# Patient Record
Sex: FEMALE | Race: WHITE | NOT HISPANIC OR LATINO | Employment: FULL TIME | URBAN - METROPOLITAN AREA
[De-identification: names, ages, dates, MRNs, and addresses within clinical notes are randomized per-mention and may not be internally consistent; named-entity substitution may affect disease eponyms.]

---

## 2021-08-05 ENCOUNTER — IMMUNIZATION (OUTPATIENT)
Dept: PRIMARY CARE CLINIC | Facility: CLINIC | Age: 21
End: 2021-08-05
Payer: COMMERCIAL

## 2021-08-05 DIAGNOSIS — Z23 NEED FOR VACCINATION: Primary | ICD-10-CM

## 2021-08-05 PROCEDURE — 0001A COVID-19, MRNA, LNP-S, PF, 30 MCG/0.3 ML DOSE VACCINE: CPT | Mod: CV19,S$GLB,, | Performed by: FAMILY MEDICINE

## 2021-08-05 PROCEDURE — 91300 COVID-19, MRNA, LNP-S, PF, 30 MCG/0.3 ML DOSE VACCINE: CPT | Mod: S$GLB,,, | Performed by: FAMILY MEDICINE

## 2021-08-05 PROCEDURE — 91300 COVID-19, MRNA, LNP-S, PF, 30 MCG/0.3 ML DOSE VACCINE: ICD-10-PCS | Mod: S$GLB,,, | Performed by: FAMILY MEDICINE

## 2021-08-05 PROCEDURE — 0001A COVID-19, MRNA, LNP-S, PF, 30 MCG/0.3 ML DOSE VACCINE: ICD-10-PCS | Mod: CV19,S$GLB,, | Performed by: FAMILY MEDICINE

## 2021-08-26 ENCOUNTER — IMMUNIZATION (OUTPATIENT)
Dept: PRIMARY CARE CLINIC | Facility: CLINIC | Age: 21
End: 2021-08-26
Payer: COMMERCIAL

## 2021-08-26 DIAGNOSIS — Z23 NEED FOR VACCINATION: Primary | ICD-10-CM

## 2021-08-26 PROCEDURE — 0002A COVID-19, MRNA, LNP-S, PF, 30 MCG/0.3 ML DOSE VACCINE: CPT | Mod: CV19,S$GLB,, | Performed by: FAMILY MEDICINE

## 2021-08-26 PROCEDURE — 91300 COVID-19, MRNA, LNP-S, PF, 30 MCG/0.3 ML DOSE VACCINE: ICD-10-PCS | Mod: S$GLB,,, | Performed by: FAMILY MEDICINE

## 2021-08-26 PROCEDURE — 91300 COVID-19, MRNA, LNP-S, PF, 30 MCG/0.3 ML DOSE VACCINE: CPT | Mod: S$GLB,,, | Performed by: FAMILY MEDICINE

## 2021-08-26 PROCEDURE — 0002A COVID-19, MRNA, LNP-S, PF, 30 MCG/0.3 ML DOSE VACCINE: ICD-10-PCS | Mod: CV19,S$GLB,, | Performed by: FAMILY MEDICINE

## 2021-11-30 ENCOUNTER — OFFICE VISIT (OUTPATIENT)
Dept: FAMILY MEDICINE | Facility: CLINIC | Age: 21
End: 2021-11-30
Payer: COMMERCIAL

## 2021-11-30 VITALS
WEIGHT: 135.56 LBS | HEART RATE: 81 BPM | TEMPERATURE: 98 F | HEIGHT: 63 IN | BODY MASS INDEX: 24.02 KG/M2 | DIASTOLIC BLOOD PRESSURE: 70 MMHG | OXYGEN SATURATION: 98 % | SYSTOLIC BLOOD PRESSURE: 116 MMHG

## 2021-11-30 DIAGNOSIS — Z00.00 ANNUAL PHYSICAL EXAM: Primary | ICD-10-CM

## 2021-11-30 PROCEDURE — 99999 PR PBB SHADOW E&M-EST. PATIENT-LVL III: CPT | Mod: PBBFAC,,, | Performed by: PHYSICIAN ASSISTANT

## 2021-11-30 PROCEDURE — 99385 PREV VISIT NEW AGE 18-39: CPT | Mod: S$GLB,,, | Performed by: PHYSICIAN ASSISTANT

## 2021-11-30 PROCEDURE — 99999 PR PBB SHADOW E&M-EST. PATIENT-LVL III: ICD-10-PCS | Mod: PBBFAC,,, | Performed by: PHYSICIAN ASSISTANT

## 2021-11-30 PROCEDURE — 99385 PR PREVENTIVE VISIT,NEW,18-39: ICD-10-PCS | Mod: S$GLB,,, | Performed by: PHYSICIAN ASSISTANT

## 2021-11-30 RX ORDER — NORGESTIMATE AND ETHINYL ESTRADIOL 0.25-0.035
1 KIT ORAL DAILY
COMMUNITY
Start: 2021-11-01 | End: 2021-11-30 | Stop reason: SDUPTHER

## 2021-11-30 RX ORDER — NORGESTIMATE AND ETHINYL ESTRADIOL 0.25-0.035
1 KIT ORAL DAILY
Qty: 30 TABLET | Refills: 11 | Status: SHIPPED | OUTPATIENT
Start: 2021-11-30 | End: 2022-10-20 | Stop reason: SDUPTHER

## 2021-12-15 ENCOUNTER — LAB VISIT (OUTPATIENT)
Dept: LAB | Facility: HOSPITAL | Age: 21
End: 2021-12-15
Attending: PHYSICIAN ASSISTANT
Payer: COMMERCIAL

## 2021-12-15 DIAGNOSIS — Z00.00 ANNUAL PHYSICAL EXAM: ICD-10-CM

## 2021-12-15 LAB
ALBUMIN SERPL BCP-MCNC: 3.9 G/DL (ref 3.5–5.2)
ALP SERPL-CCNC: 66 U/L (ref 55–135)
ALT SERPL W/O P-5'-P-CCNC: 6 U/L (ref 10–44)
ANION GAP SERPL CALC-SCNC: 9 MMOL/L (ref 8–16)
AST SERPL-CCNC: 14 U/L (ref 10–40)
BASOPHILS # BLD AUTO: 0.07 K/UL (ref 0–0.2)
BASOPHILS NFR BLD: 0.9 % (ref 0–1.9)
BILIRUB SERPL-MCNC: 0.8 MG/DL (ref 0.1–1)
BUN SERPL-MCNC: 8 MG/DL (ref 6–20)
CALCIUM SERPL-MCNC: 9.3 MG/DL (ref 8.7–10.5)
CHLORIDE SERPL-SCNC: 104 MMOL/L (ref 95–110)
CHOLEST SERPL-MCNC: 135 MG/DL (ref 120–199)
CHOLEST/HDLC SERPL: 2.6 {RATIO} (ref 2–5)
CO2 SERPL-SCNC: 21 MMOL/L (ref 23–29)
CREAT SERPL-MCNC: 0.7 MG/DL (ref 0.5–1.4)
DIFFERENTIAL METHOD: ABNORMAL
EOSINOPHIL # BLD AUTO: 1.2 K/UL (ref 0–0.5)
EOSINOPHIL NFR BLD: 14.5 % (ref 0–8)
ERYTHROCYTE [DISTWIDTH] IN BLOOD BY AUTOMATED COUNT: 12 % (ref 11.5–14.5)
EST. GFR  (AFRICAN AMERICAN): >60 ML/MIN/1.73 M^2
EST. GFR  (NON AFRICAN AMERICAN): >60 ML/MIN/1.73 M^2
GLUCOSE SERPL-MCNC: 87 MG/DL (ref 70–110)
HCT VFR BLD AUTO: 36.3 % (ref 37–48.5)
HCV AB SERPL QL IA: NEGATIVE
HDLC SERPL-MCNC: 52 MG/DL (ref 40–75)
HDLC SERPL: 38.5 % (ref 20–50)
HGB BLD-MCNC: 12.1 G/DL (ref 12–16)
HIV 1+2 AB+HIV1 P24 AG SERPL QL IA: NEGATIVE
IMM GRANULOCYTES # BLD AUTO: 0.03 K/UL (ref 0–0.04)
IMM GRANULOCYTES NFR BLD AUTO: 0.4 % (ref 0–0.5)
LDLC SERPL CALC-MCNC: 72 MG/DL (ref 63–159)
LYMPHOCYTES # BLD AUTO: 2.2 K/UL (ref 1–4.8)
LYMPHOCYTES NFR BLD: 26.9 % (ref 18–48)
MCH RBC QN AUTO: 29.5 PG (ref 27–31)
MCHC RBC AUTO-ENTMCNC: 33.3 G/DL (ref 32–36)
MCV RBC AUTO: 89 FL (ref 82–98)
MONOCYTES # BLD AUTO: 0.6 K/UL (ref 0.3–1)
MONOCYTES NFR BLD: 8 % (ref 4–15)
NEUTROPHILS # BLD AUTO: 4 K/UL (ref 1.8–7.7)
NEUTROPHILS NFR BLD: 49.3 % (ref 38–73)
NONHDLC SERPL-MCNC: 83 MG/DL
NRBC BLD-RTO: 0 /100 WBC
PLATELET # BLD AUTO: 207 K/UL (ref 150–450)
PMV BLD AUTO: 12.2 FL (ref 9.2–12.9)
POTASSIUM SERPL-SCNC: 3.6 MMOL/L (ref 3.5–5.1)
PROT SERPL-MCNC: 7 G/DL (ref 6–8.4)
RBC # BLD AUTO: 4.1 M/UL (ref 4–5.4)
SODIUM SERPL-SCNC: 134 MMOL/L (ref 136–145)
TRIGL SERPL-MCNC: 55 MG/DL (ref 30–150)
WBC # BLD AUTO: 8 K/UL (ref 3.9–12.7)

## 2021-12-15 PROCEDURE — 36415 COLL VENOUS BLD VENIPUNCTURE: CPT | Mod: PO | Performed by: PHYSICIAN ASSISTANT

## 2021-12-15 PROCEDURE — 80061 LIPID PANEL: CPT | Performed by: PHYSICIAN ASSISTANT

## 2021-12-15 PROCEDURE — 86803 HEPATITIS C AB TEST: CPT | Performed by: PHYSICIAN ASSISTANT

## 2021-12-15 PROCEDURE — 87389 HIV-1 AG W/HIV-1&-2 AB AG IA: CPT | Performed by: PHYSICIAN ASSISTANT

## 2021-12-15 PROCEDURE — 85025 COMPLETE CBC W/AUTO DIFF WBC: CPT | Performed by: PHYSICIAN ASSISTANT

## 2021-12-15 PROCEDURE — 80053 COMPREHEN METABOLIC PANEL: CPT | Performed by: PHYSICIAN ASSISTANT

## 2021-12-16 DIAGNOSIS — R82.90 ABNORMAL URINE: Primary | ICD-10-CM

## 2021-12-17 ENCOUNTER — LAB VISIT (OUTPATIENT)
Dept: LAB | Facility: HOSPITAL | Age: 21
End: 2021-12-17
Attending: PHYSICIAN ASSISTANT
Payer: COMMERCIAL

## 2021-12-17 DIAGNOSIS — R82.90 ABNORMAL URINE: ICD-10-CM

## 2021-12-17 LAB
BACTERIA #/AREA URNS AUTO: ABNORMAL /HPF
BILIRUB UR QL STRIP: NEGATIVE
CAOX CRY UR QL COMP ASSIST: ABNORMAL
CLARITY UR REFRACT.AUTO: ABNORMAL
COLOR UR AUTO: ABNORMAL
GLUCOSE UR QL STRIP: NEGATIVE
HGB UR QL STRIP: ABNORMAL
HYALINE CASTS UR QL AUTO: 0 /LPF
KETONES UR QL STRIP: NEGATIVE
LEUKOCYTE ESTERASE UR QL STRIP: ABNORMAL
MICROSCOPIC COMMENT: ABNORMAL
NITRITE UR QL STRIP: NEGATIVE
PH UR STRIP: 5 [PH] (ref 5–8)
PROT UR QL STRIP: ABNORMAL
RBC #/AREA URNS AUTO: 12 /HPF (ref 0–4)
SP GR UR STRIP: 1.02 (ref 1–1.03)
SQUAMOUS #/AREA URNS AUTO: 21 /HPF
URN SPEC COLLECT METH UR: ABNORMAL
WBC #/AREA URNS AUTO: 71 /HPF (ref 0–5)

## 2021-12-17 PROCEDURE — 81001 URINALYSIS AUTO W/SCOPE: CPT | Performed by: PHYSICIAN ASSISTANT

## 2021-12-21 DIAGNOSIS — R82.90 ABNORMAL URINE: Primary | ICD-10-CM

## 2021-12-29 ENCOUNTER — APPOINTMENT (OUTPATIENT)
Dept: FAMILY MEDICINE | Facility: CLINIC | Age: 21
End: 2021-12-29
Payer: COMMERCIAL

## 2022-10-20 RX ORDER — NORGESTIMATE AND ETHINYL ESTRADIOL 0.25-0.035
1 KIT ORAL DAILY
Qty: 30 TABLET | Refills: 0 | Status: SHIPPED | OUTPATIENT
Start: 2022-10-20 | End: 2022-11-17

## 2022-10-20 NOTE — TELEPHONE ENCOUNTER
----- Message from Renetta Jurado sent at 10/20/2022  8:03 AM CDT -----  Type:  RX Refill Request    Who Called: Pt Mother Aviva     Refill or New Rx: Refill     RX Name and Strength:NORMA 0.25-35 mg-mcg per tablet    How is the patient currently taking it? Sig - Route: Take 1 tablet by mouth once daily. - Oral  Sent to pharmacy as: NORMA 0.25-35 mg-mcg per tablet        Is this a 30 day or 90 day RX:    Preferred Pharmacy with phone number:[facility] #[id number] - [place], [place] - [address] [facility]    Local or Mail Order:Local     Ordering Provider:TYRA Angulo    Would the patient rather a call back or a response via [facility]?  Call back     Best Call Back Number: ([email or web address]). [phone] Mother     Additional Information:

## 2022-11-06 ENCOUNTER — HOSPITAL ENCOUNTER (EMERGENCY)
Facility: HOSPITAL | Age: 22
Discharge: HOME | End: 2022-11-06
Attending: EMERGENCY MEDICINE
Payer: COMMERCIAL

## 2022-11-06 VITALS
RESPIRATION RATE: 16 BRPM | BODY MASS INDEX: 22.15 KG/M2 | HEIGHT: 63 IN | DIASTOLIC BLOOD PRESSURE: 83 MMHG | SYSTOLIC BLOOD PRESSURE: 147 MMHG | OXYGEN SATURATION: 99 % | WEIGHT: 125 LBS | HEART RATE: 101 BPM | TEMPERATURE: 98 F

## 2022-11-06 DIAGNOSIS — S39.012A STRAIN OF LUMBAR REGION, INITIAL ENCOUNTER: Primary | ICD-10-CM

## 2022-11-06 LAB
B-HCG UR QL: NEGATIVE
CTP QC/QA: YES

## 2022-11-06 PROCEDURE — 99283 EMERGENCY DEPT VISIT LOW MDM: CPT

## 2022-11-06 PROCEDURE — 81025 URINE PREGNANCY TEST: CPT | Performed by: NURSE PRACTITIONER

## 2022-11-06 RX ORDER — NAPROXEN 500 MG/1
500 TABLET ORAL 2 TIMES DAILY PRN
Qty: 20 TABLET | Refills: 0 | Status: SHIPPED | OUTPATIENT
Start: 2022-11-06 | End: 2023-02-15

## 2022-11-06 RX ORDER — METHOCARBAMOL 500 MG/1
1000 TABLET, FILM COATED ORAL
Qty: 30 TABLET | Refills: 0 | Status: SHIPPED | OUTPATIENT
Start: 2022-11-06 | End: 2022-11-11

## 2022-11-06 NOTE — ED PROVIDER NOTES
Encounter Date: 11/6/2022       History     Chief Complaint   Patient presents with    Back Pain     At 1030 today. Mid back while lifting heavy object     21-year-old female presents complaining of back pain starting today patient reports that she was picking up a heavy object and experience some pain in the low to mid back area patient denies fall or trauma patient denies weakness or numbness patient denies incontinence patient has no other acute complaints at this time.    Review of patient's allergies indicates:  No Known Allergies  No past medical history on file.  No past surgical history on file.  No family history on file.  Social History     Tobacco Use    Smoking status: Never    Smokeless tobacco: Never   Substance Use Topics    Alcohol use: Yes     Comment: socially     Drug use: Never     Review of Systems   Constitutional:  Negative for fever.   HENT:  Negative for congestion, rhinorrhea, sore throat and trouble swallowing.    Eyes:  Negative for visual disturbance.   Respiratory:  Negative for cough, chest tightness, shortness of breath and wheezing.    Cardiovascular:  Negative for chest pain, palpitations and leg swelling.   Gastrointestinal:  Negative for abdominal distention, abdominal pain, constipation, diarrhea, nausea and vomiting.   Genitourinary:  Negative for difficulty urinating, dysuria, flank pain and frequency.   Musculoskeletal:  Positive for back pain. Negative for arthralgias, joint swelling and neck pain.   Skin:  Negative for color change and rash.   Neurological:  Negative for dizziness, syncope, speech difficulty, weakness, numbness and headaches.   All other systems reviewed and are negative.    Physical Exam     Initial Vitals [11/06/22 1220]   BP Pulse Resp Temp SpO2   (!) 147/83 101 16 98.1 °F (36.7 °C) 99 %      MAP       --         Physical Exam    Nursing note and vitals reviewed.  Constitutional: She appears well-developed and well-nourished. She is not diaphoretic. No  distress.   HENT:   Head: Normocephalic and atraumatic.   Right Ear: External ear normal.   Left Ear: External ear normal.   Nose: Nose normal.   Mouth/Throat: Oropharynx is clear and moist. No oropharyngeal exudate.   Eyes: Conjunctivae and EOM are normal. Pupils are equal, round, and reactive to light. Right eye exhibits no discharge. Left eye exhibits no discharge. No scleral icterus.   Neck: Neck supple. No thyromegaly present. No tracheal deviation present. No JVD present.   Normal range of motion.  Cardiovascular:  Normal rate, regular rhythm, normal heart sounds and intact distal pulses.     Exam reveals no gallop and no friction rub.       No murmur heard.  Pulmonary/Chest: Breath sounds normal. No stridor. No respiratory distress. She has no wheezes. She has no rhonchi. She has no rales. She exhibits no tenderness.   Abdominal: Abdomen is soft. Bowel sounds are normal. She exhibits no distension and no mass. There is no abdominal tenderness. There is no rebound and no guarding.   Musculoskeletal:         General: Tenderness present. No edema. Normal range of motion.      Cervical back: Normal range of motion and neck supple.      Comments: Patient has right paraspinal tenderness with no radiation no step-off or bony deformity of the spine no midline tenderness     Lymphadenopathy:     She has no cervical adenopathy.   Neurological: She is alert and oriented to person, place, and time. She has normal strength. No cranial nerve deficit or sensory deficit.   Skin: Skin is warm and dry. No rash and no abscess noted. No erythema. No pallor.       ED Course   Procedures  Labs Reviewed   POCT URINE PREGNANCY          Imaging Results              X-Ray Lumbar Spine 5 View (Final result)  Result time 11/06/22 14:32:33   Procedure changed from X-Ray Lumbar Spine Ap And Lateral     Final result by Jordan Camacho MD (11/06/22 14:32:33)                   Narrative:    HISTORY: Low back pain,  lifting  injury.    FINDINGS: 5 views of the lumbar spine with no prior studies for comparison show normal lordotic curvature and vertebral body alignment, with no acute fractures or destructive osseous lesions. The intervertebral disc spaces are preserved, with no facet arthropathy or spondylolysis.    The sacroiliac joints are normal, with normal bone mineralization. No radiopaque foreign bodies.    IMPRESSION: Negative lumbar spine radiographs.    Electronically signed by:  Jordan Camacho MD  11/6/2022 2:32 PM CST Workstation: [id number]                                     Medications - No data to display  Medical Decision Making:   History:   Old Medical Records: I decided to obtain old medical records.  Initial Assessment:   Emergent evaluation of a 21-year-old female presenting with back pain differential diagnosis includes soft tissue injury, fracture, sprain          Attending Attestation:             Attending ED Notes:   Patient's imaging shows no significant acute abnormalities patient is diagnosed with musculoskeletal pain and will be started on a course of muscle relaxer and pain medication patient is to follow up with PCP in the next 2-3 days for further evaluation and management and is cautioned to return immediately to the ER for any worsening or for any further concerns    I had a detailed discussion with the patient and/or guardian regarding: The historical points, exam findings, and diagnostic results supporting the discharge diagnosis, lab results, pertinent radiology results, and the need for outpatient follow-up, for definitive care with a family practitioner and to return to the emergency department if symptoms worsen or persist or if there are any questions or concerns that arise at home. All questions have been answered in detail. Strict return to Emergency Department precautions have been provided.     A dictation software program was used for this note.  Please expect some simple typographical   errors in this note.     This patient was seen during the context of the Covid 19 global pandemic where local, state, hospital guidelines, were followed to the best of ability given the circumstances of the pandemic.                       Clinical Impression:   Final diagnoses:  [S39.012A] Strain of lumbar region, initial encounter (Primary)      ED Disposition Condition    Discharge Stable          ED Prescriptions       Medication Sig Dispense Start Date End Date Auth. Provider    naproxen (NAPROSYN) 500 MG tablet Take 1 tablet (500 mg total) by mouth 2 (two) times daily as needed (As needed for pain, take with meals). 20 tablet 11/6/2022 -- Kieran Lockwood MD    methocarbamoL (ROBAXIN) 500 MG Tab Take 2 tablets (1,000 mg total) by mouth after meals as needed (As needed for muscle soreness). 30 tablet 11/6/2022 11/11/2022 Kieran Lockwood MD          Follow-up Information       Follow up With Specialties Details Why Contact Info Additional Information    [facility] - Emergency Dept Emergency Medicine  If symptoms worsen [address] [facility] [id number] [phone] 1st floor    Cordelia Santos MD Family Medicine Schedule an appointment as soon as possible for a visit in 2 days  [address]  [facility] [id number] [phone]                Kieran Lockwood MD  11/06/22 [id number]

## 2022-12-05 ENCOUNTER — OCCUPATIONAL HEALTH (OUTPATIENT)
Dept: URGENT CARE | Facility: CLINIC | Age: 22
End: 2022-12-05

## 2022-12-05 DIAGNOSIS — Z13.9 ENCOUNTER FOR SCREENING: Primary | ICD-10-CM

## 2022-12-05 LAB
COLLECTION ONLY: NORMAL
TB INDURATION - 48 HR READ: NORMAL
TB INDURATION - 72 HR READ: NORMAL
TB SKIN TEST - 48 HR READ: NORMAL
TB SKIN TEST - 72 HR READ: NORMAL

## 2022-12-05 PROCEDURE — 86580 POCT TB SKIN TEST: ICD-10-PCS | Mod: S$GLB,,, | Performed by: NURSE PRACTITIONER

## 2022-12-05 PROCEDURE — 86580 TB INTRADERMAL TEST: CPT | Mod: S$GLB,,, | Performed by: NURSE PRACTITIONER

## 2023-02-15 ENCOUNTER — OFFICE VISIT (OUTPATIENT)
Dept: FAMILY MEDICINE | Facility: CLINIC | Age: 23
End: 2023-02-15
Payer: COMMERCIAL

## 2023-02-15 VITALS
HEART RATE: 82 BPM | TEMPERATURE: 98 F | SYSTOLIC BLOOD PRESSURE: 112 MMHG | DIASTOLIC BLOOD PRESSURE: 68 MMHG | HEIGHT: 63 IN | OXYGEN SATURATION: 100 % | WEIGHT: 137.38 LBS | BODY MASS INDEX: 24.34 KG/M2

## 2023-02-15 DIAGNOSIS — Z30.9 ENCOUNTER FOR CONTRACEPTIVE MANAGEMENT, UNSPECIFIED TYPE: ICD-10-CM

## 2023-02-15 DIAGNOSIS — K12.0 APHTHOUS ULCER: ICD-10-CM

## 2023-02-15 DIAGNOSIS — Z00.00 ANNUAL PHYSICAL EXAM: Primary | ICD-10-CM

## 2023-02-15 PROCEDURE — 99395 PREV VISIT EST AGE 18-39: CPT | Mod: S$GLB,,, | Performed by: PHYSICIAN ASSISTANT

## 2023-02-15 PROCEDURE — 99999 PR PBB SHADOW E&M-EST. PATIENT-LVL III: CPT | Mod: PBBFAC,,, | Performed by: PHYSICIAN ASSISTANT

## 2023-02-15 PROCEDURE — 99999 PR PBB SHADOW E&M-EST. PATIENT-LVL III: ICD-10-PCS | Mod: PBBFAC,,, | Performed by: PHYSICIAN ASSISTANT

## 2023-02-15 PROCEDURE — 99395 PR PREVENTIVE VISIT,EST,18-39: ICD-10-PCS | Mod: S$GLB,,, | Performed by: PHYSICIAN ASSISTANT

## 2023-02-15 RX ORDER — TRIAMCINOLONE ACETONIDE 1 MG/G
PASTE DENTAL 2 TIMES DAILY
Qty: 5 G | Refills: 12 | Status: SHIPPED | OUTPATIENT
Start: 2023-02-15 | End: 2023-03-17

## 2023-02-15 RX ORDER — NORGESTIMATE AND ETHINYL ESTRADIOL 0.25-0.035
1 KIT ORAL DAILY
Qty: 28 TABLET | Refills: 2 | Status: SHIPPED | OUTPATIENT
Start: 2023-02-15 | End: 2023-05-08 | Stop reason: SDUPTHER

## 2023-02-15 NOTE — PROGRESS NOTES
Subjective:       Patient ID: Iris Coronado is a 22 y.o. female.    Chief Complaint: Annual Exam    Patient presents for annual exam.  She is due for PAP and Chlamydia screenings.  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  Patients patient medical/surgical, social and family histories have been reviewed       Review of Systems   Constitutional:  Negative for activity change, appetite change, fatigue and unexpected weight change.   HENT:  Positive for mouth sores.    Eyes:  Negative for visual disturbance.   Respiratory:  Negative for cough and shortness of breath.    Cardiovascular:  Negative for chest pain, palpitations and leg swelling.   Gastrointestinal:  Negative for abdominal pain, constipation, diarrhea and nausea.   Endocrine: Negative for polydipsia and polyuria.   Genitourinary:  Negative for difficulty urinating.   Musculoskeletal:  Negative for arthralgias.   Skin:  Negative for rash.   Neurological:  Negative for dizziness, light-headedness and headaches.   Psychiatric/Behavioral:  Negative for dysphoric mood and sleep disturbance. The patient is not nervous/anxious.      Objective:      Physical Exam  Constitutional:       General: She is not in acute distress.     Appearance: She is well-developed.   HENT:      Head: Normocephalic and atraumatic.      Right Ear: Tympanic membrane, ear canal and external ear normal.      Left Ear: Tympanic membrane, ear canal and external ear normal.      Mouth/Throat:      Mouth: Mucous membranes are moist.      Pharynx: Oropharynx is clear.   Eyes:      Conjunctiva/sclera: Conjunctivae normal.   Cardiovascular:      Rate and Rhythm: Normal rate and regular rhythm.      Heart sounds: Normal heart sounds.   Pulmonary:      Effort: Pulmonary effort is normal.      Breath sounds: Normal breath sounds.   Musculoskeletal:      Right lower leg: No edema.      Left lower leg: No edema.   Skin:     General: Skin is warm and dry.   Neurological:      General: No  "focal deficit present.      Mental Status: She is alert.   Psychiatric:         Behavior: Behavior is cooperative.       Assessment:       1. Annual physical exam    2. Aphthous ulcer    3. Encounter for contraceptive management, unspecified type          Plan:       Iris was seen today for annual exam.    Diagnoses and all orders for this visit:    Annual physical exam    Aphthous ulcer  -     triamcinolone acetonide 0.1% (KENALOG) 0.1 % paste; Place onto teeth 2 (two) times daily.    Encounter for contraceptive management, unspecified type  -     norgestimate-ethinyl estradioL (NORMA) 0.25-35 mg-mcg per tablet; Take 1 tablet by mouth once daily.           Follow up for PAP within next 3 mo .           Documentation entered by me for this encounter may have been done in part using speech-recognition technology. Although I have made an effort to ensure accuracy, "sound like" errors may exist and should be interpreted in context.    "

## 2023-05-08 DIAGNOSIS — Z30.9 ENCOUNTER FOR CONTRACEPTIVE MANAGEMENT, UNSPECIFIED TYPE: ICD-10-CM

## 2023-05-08 NOTE — TELEPHONE ENCOUNTER
----- Message from David Montoya sent at 5/8/2023  4:42 PM CDT -----  Regarding: Return Call  Contact: Patient  Type:  Patient Returning Call    Who Called:Patient   Who Left Message for Patient:office staff please call  Does the patient know what this is regarding?:Birth Control medication running out before appointment 05/17/23  Would the patient rather a call back or a response via [facility]? call  Best Call Back Number:[phone]  Additional Information:   [id number] [address] & [address] [address]  Phone: [phone] Fax: [phone]    Patient called regarding needing one order of birth control before visit.

## 2023-05-10 RX ORDER — NORGESTIMATE AND ETHINYL ESTRADIOL 0.25-0.035
1 KIT ORAL DAILY
Qty: 28 TABLET | Refills: 11 | Status: SHIPPED | OUTPATIENT
Start: 2023-05-10

## 2023-05-24 ENCOUNTER — OFFICE VISIT (OUTPATIENT)
Dept: FAMILY MEDICINE | Facility: CLINIC | Age: 23
End: 2023-05-24
Payer: COMMERCIAL

## 2023-05-24 VITALS
DIASTOLIC BLOOD PRESSURE: 70 MMHG | TEMPERATURE: 98 F | RESPIRATION RATE: 18 BRPM | OXYGEN SATURATION: 99 % | HEART RATE: 114 BPM | WEIGHT: 131.63 LBS | HEIGHT: 63 IN | SYSTOLIC BLOOD PRESSURE: 128 MMHG | BODY MASS INDEX: 23.32 KG/M2

## 2023-05-24 DIAGNOSIS — Z12.4 SCREENING FOR CERVICAL CANCER: ICD-10-CM

## 2023-05-24 DIAGNOSIS — Z11.8 SCREENING FOR CHLAMYDIAL DISEASE: ICD-10-CM

## 2023-05-24 DIAGNOSIS — Z00.00 ANNUAL PHYSICAL EXAM: Primary | ICD-10-CM

## 2023-05-24 PROCEDURE — 99999 PR PBB SHADOW E&M-EST. PATIENT-LVL IV: CPT | Mod: PBBFAC,,, | Performed by: PHYSICIAN ASSISTANT

## 2023-05-24 PROCEDURE — 87591 N.GONORRHOEAE DNA AMP PROB: CPT | Performed by: PHYSICIAN ASSISTANT

## 2023-05-24 PROCEDURE — 99999 PR PBB SHADOW E&M-EST. PATIENT-LVL IV: ICD-10-PCS | Mod: PBBFAC,,, | Performed by: PHYSICIAN ASSISTANT

## 2023-05-24 PROCEDURE — 88175 CYTOPATH C/V AUTO FLUID REDO: CPT | Performed by: PHYSICIAN ASSISTANT

## 2023-05-24 PROCEDURE — 99395 PR PREVENTIVE VISIT,EST,18-39: ICD-10-PCS | Mod: S$GLB,,, | Performed by: PHYSICIAN ASSISTANT

## 2023-05-24 PROCEDURE — 99395 PREV VISIT EST AGE 18-39: CPT | Mod: S$GLB,,, | Performed by: PHYSICIAN ASSISTANT

## 2023-05-24 NOTE — PROGRESS NOTES
Subjective:       Patient ID: Iris Coronado is a 22 y.o. female.    Chief Complaint: Abnormal Pap Smear    Patient presents for annual exam including cervical cancer screening.  She is .  She has never had PAP.  OCP for contraception.  She has normal monthly menses. Patients patient medical/surgical, social and family histories have been reviewed         Review of Systems   Genitourinary:  Negative for menstrual problem and pelvic pain.     Objective:      Physical Exam  Exam conducted with a chaperone present.   Constitutional:       General: She is not in acute distress.     Appearance: Normal appearance. She is not ill-appearing.   HENT:      Head: Normocephalic and atraumatic.   Eyes:      Conjunctiva/sclera: Conjunctivae normal.   Pulmonary:      Effort: Pulmonary effort is normal.   Chest:   Breasts:     Right: Normal.      Left: Normal.   Genitourinary:     Exam position: Supine.      Pubic Area: No rash.       Labia:         Right: No rash or lesion.         Left: No rash or lesion.       Urethra: No prolapse.      Vagina: Normal.      Cervix: Normal.      Uterus: Normal.       Adnexa: Right adnexa normal and left adnexa normal.   Lymphadenopathy:      Upper Body:      Right upper body: No axillary or pectoral adenopathy.      Left upper body: No axillary or pectoral adenopathy.      Lower Body: No right inguinal adenopathy. No left inguinal adenopathy.   Skin:         Neurological:      Mental Status: She is alert.   Psychiatric:         Mood and Affect: Mood normal.       Assessment:       1. Annual physical exam    2. Screening for cervical cancer    3. Screening for chlamydial disease        Plan:       Iris was seen today for abnormal pap smear.    Diagnoses and all orders for this visit:    Annual physical exam  -     Ambulatory referral/consult to Dermatology; Future    Screening for cervical cancer  -     Liquid-Based Pap Smear, Screening    Screening for chlamydial disease  -     C.  trachomatis/N. gonorrhoeae by AMP DNA [facility]; Cervicovaginal         Further recommendations will be made based on results

## 2023-05-25 ENCOUNTER — TELEPHONE (OUTPATIENT)
Dept: FAMILY MEDICINE | Facility: CLINIC | Age: 23
End: 2023-05-25
Payer: COMMERCIAL

## 2023-05-25 DIAGNOSIS — A74.9 CHLAMYDIA INFECTION: Primary | ICD-10-CM

## 2023-05-25 DIAGNOSIS — Z11.3 ROUTINE SCREENING FOR STI (SEXUALLY TRANSMITTED INFECTION): ICD-10-CM

## 2023-05-25 LAB
C TRACH DNA SPEC QL NAA+PROBE: DETECTED
N GONORRHOEA DNA SPEC QL NAA+PROBE: NOT DETECTED

## 2023-05-25 RX ORDER — DOXYCYCLINE HYCLATE 100 MG
100 TABLET ORAL 2 TIMES DAILY
Qty: 14 TABLET | Refills: 0 | Status: SHIPPED | OUTPATIENT
Start: 2023-05-25 | End: 2023-06-01

## 2023-05-26 ENCOUNTER — TELEPHONE (OUTPATIENT)
Dept: FAMILY MEDICINE | Facility: CLINIC | Age: 23
End: 2023-05-26
Payer: COMMERCIAL

## 2023-05-26 ENCOUNTER — TELEPHONE (OUTPATIENT)
Dept: DERMATOLOGY | Facility: CLINIC | Age: 23
End: 2023-05-26
Payer: COMMERCIAL

## 2023-05-30 LAB
FINAL PATHOLOGIC DIAGNOSIS: NORMAL
Lab: NORMAL

## 2023-06-16 ENCOUNTER — LAB VISIT (OUTPATIENT)
Dept: LAB | Facility: HOSPITAL | Age: 23
End: 2023-06-16
Attending: PHYSICIAN ASSISTANT
Payer: COMMERCIAL

## 2023-06-16 DIAGNOSIS — Z11.3 ROUTINE SCREENING FOR STI (SEXUALLY TRANSMITTED INFECTION): ICD-10-CM

## 2023-06-16 PROCEDURE — 87389 HIV-1 AG W/HIV-1&-2 AB AG IA: CPT | Performed by: PHYSICIAN ASSISTANT

## 2023-06-16 PROCEDURE — 86592 SYPHILIS TEST NON-TREP QUAL: CPT | Performed by: PHYSICIAN ASSISTANT

## 2023-06-16 PROCEDURE — 80074 ACUTE HEPATITIS PANEL: CPT | Performed by: PHYSICIAN ASSISTANT

## 2023-06-16 PROCEDURE — 36415 COLL VENOUS BLD VENIPUNCTURE: CPT | Mod: PO | Performed by: PHYSICIAN ASSISTANT

## 2023-06-17 LAB
HAV IGM SERPL QL IA: NORMAL
HBV CORE IGM SERPL QL IA: NORMAL
HBV SURFACE AG SERPL QL IA: NORMAL
HCV AB SERPL QL IA: NORMAL
HIV 1+2 AB+HIV1 P24 AG SERPL QL IA: NORMAL
RPR SER QL: NORMAL

## 2023-06-20 ENCOUNTER — OFFICE VISIT (OUTPATIENT)
Dept: FAMILY MEDICINE | Facility: CLINIC | Age: 23
End: 2023-06-20
Payer: COMMERCIAL

## 2023-06-20 ENCOUNTER — HOSPITAL ENCOUNTER (OUTPATIENT)
Dept: RADIOLOGY | Facility: CLINIC | Age: 23
Discharge: HOME | End: 2023-06-20
Attending: PHYSICIAN ASSISTANT
Payer: COMMERCIAL

## 2023-06-20 VITALS
DIASTOLIC BLOOD PRESSURE: 74 MMHG | BODY MASS INDEX: 23.4 KG/M2 | SYSTOLIC BLOOD PRESSURE: 118 MMHG | HEART RATE: 82 BPM | WEIGHT: 132.06 LBS | HEIGHT: 63 IN | OXYGEN SATURATION: 99 % | TEMPERATURE: 98 F

## 2023-06-20 DIAGNOSIS — M25.531 RIGHT WRIST PAIN: Primary | ICD-10-CM

## 2023-06-20 DIAGNOSIS — M25.531 RIGHT WRIST PAIN: ICD-10-CM

## 2023-06-20 PROCEDURE — 73110 X-RAY EXAM OF WRIST: CPT | Mod: TC,FY,PO,RT

## 2023-06-20 PROCEDURE — 73110 XR WRIST COMPLETE 3 VIEWS RIGHT: ICD-10-PCS | Mod: 26,RT,S$GLB, | Performed by: RADIOLOGY

## 2023-06-20 PROCEDURE — 73110 X-RAY EXAM OF WRIST: CPT | Mod: 26,RT,S$GLB, | Performed by: RADIOLOGY

## 2023-06-20 PROCEDURE — 99213 OFFICE O/P EST LOW 20 MIN: CPT | Mod: S$GLB,,, | Performed by: PHYSICIAN ASSISTANT

## 2023-06-20 PROCEDURE — 99213 PR OFFICE/OUTPT VISIT, EST, LEVL III, 20-29 MIN: ICD-10-PCS | Mod: S$GLB,,, | Performed by: PHYSICIAN ASSISTANT

## 2023-06-20 PROCEDURE — 99999 PR PBB SHADOW E&M-EST. PATIENT-LVL III: ICD-10-PCS | Mod: PBBFAC,,, | Performed by: PHYSICIAN ASSISTANT

## 2023-06-20 PROCEDURE — 99999 PR PBB SHADOW E&M-EST. PATIENT-LVL III: CPT | Mod: PBBFAC,,, | Performed by: PHYSICIAN ASSISTANT

## 2023-06-20 RX ORDER — MELOXICAM 15 MG/1
15 TABLET ORAL DAILY
Qty: 30 TABLET | Refills: 0 | Status: SHIPPED | OUTPATIENT
Start: 2023-06-20 | End: 2023-07-18

## 2023-06-20 NOTE — PROGRESS NOTES
Subjective:       Patient ID: Iris Coronado is a 22 y.o. female.    Chief Complaint: wrist pain right   Pt here for R wrist pain. She has a history of ganglion cysts associated with pain in the right wrist 2 years ago that were drained. She is now complaining of intermittent pain in the right wrist that started a few days ago. She endorses reduced range of motion and pain upon flexion and extension of her wrist which affects her work. She denies trauma to the affected area. Denies swelling. Denies taking medication for pain relief.     Review of Systems   Constitutional:  Negative for activity change, appetite change, fever and unexpected weight change.   Respiratory:  Negative for cough, shortness of breath and wheezing.    Cardiovascular:  Negative for chest pain, palpitations and leg swelling.   Gastrointestinal:  Negative for blood in stool, constipation, diarrhea, nausea and vomiting.   Endocrine: Negative for polydipsia and polyuria.   Genitourinary:  Negative for dysuria, frequency, hematuria and urgency.   Musculoskeletal:  Negative for arthralgias.   Skin:  Negative for rash.   Neurological:  Negative for dizziness, weakness, light-headedness and headaches.   Psychiatric/Behavioral:  Negative for confusion and dysphoric mood. The patient is not nervous/anxious.      Objective:      Physical Exam  Constitutional:       Appearance: Normal appearance.   HENT:      Head: Normocephalic and atraumatic.      Mouth/Throat:      Mouth: Mucous membranes are moist.      Pharynx: Oropharynx is clear.   Cardiovascular:      Rate and Rhythm: Normal rate and regular rhythm.      Pulses: Normal pulses.      Heart sounds: Normal heart sounds.   Pulmonary:      Effort: Pulmonary effort is normal.      Breath sounds: Normal breath sounds.   Musculoskeletal:      Right wrist: Tenderness present.      Left wrist: Normal.        Arms:    Skin:     General: Skin is warm and dry.   Neurological:      General: No focal deficit  "present.      Mental Status: She is alert and oriented to person, place, and time.   Psychiatric:         Mood and Affect: Mood normal.         Behavior: Behavior normal.       Assessment:       1. Right wrist pain        Plan:       Diagnoses and all orders for this visit:    Right wrist pain  -     X-Ray Wrist Complete 3 views Right; Future  -     meloxicam (MOBIC) 15 MG tablet; Take 1 tablet (15 mg total) by mouth once daily.  -     Ambulatory referral/consult to Orthopedics; Future           Follow up for xray today, ortho asap, .       patient was seen and examined by me and I agree with HPI, ROS, PE as well as  assessment and plan         Documentation entered by me for this encounter may have been done in part using speech-recognition technology. Although I have made an effort to ensure accuracy, "sound like" errors may exist and should be interpreted in context.     "

## 2023-06-23 ENCOUNTER — OFFICE VISIT (OUTPATIENT)
Dept: ORTHOPEDICS | Facility: CLINIC | Age: 23
End: 2023-06-23
Payer: COMMERCIAL

## 2023-06-23 VITALS — BODY MASS INDEX: 23.4 KG/M2 | WEIGHT: 132.06 LBS | HEIGHT: 63 IN

## 2023-06-23 DIAGNOSIS — M25.531 RIGHT WRIST PAIN: ICD-10-CM

## 2023-06-23 DIAGNOSIS — M67.431 GANGLION CYST OF DORSUM OF RIGHT WRIST: Primary | ICD-10-CM

## 2023-06-23 PROCEDURE — 99203 PR OFFICE/OUTPT VISIT, NEW, LEVL III, 30-44 MIN: ICD-10-PCS | Mod: S$GLB,,, | Performed by: ORTHOPAEDIC SURGERY

## 2023-06-23 PROCEDURE — 99999 PR PBB SHADOW E&M-EST. PATIENT-LVL III: ICD-10-PCS | Mod: PBBFAC,,, | Performed by: ORTHOPAEDIC SURGERY

## 2023-06-23 PROCEDURE — 99203 OFFICE O/P NEW LOW 30 MIN: CPT | Mod: S$GLB,,, | Performed by: ORTHOPAEDIC SURGERY

## 2023-06-23 PROCEDURE — 99999 PR PBB SHADOW E&M-EST. PATIENT-LVL III: CPT | Mod: PBBFAC,,, | Performed by: ORTHOPAEDIC SURGERY

## 2023-06-23 NOTE — PROGRESS NOTES
Subjective:      Patient ID: Iris Coronado is a 22 y.o. female.    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  She states she had a cyst that was aspirated several years ago she would her wrist continued to ache even after the aspiration.  She is started a new job about 2 months prior and is complaining of recurrence of her swelling.  Denies any trauma.  No numbness or tingling.  ROS      Objective:    Ortho Exam     Constitutional:   Patient is alert  and oriented in no acute distress  HEENT:  normocephalic atraumatic; PERRL EOMI  Neck:  Supple without adenopathy  Cardiovascular:  Normal rate and rhythm  Pulmonary:  Normal respiratory effort normal chest wall expansion  Abdominal:  Nonprotuberant nondistended  Musculoskeletal:  Patent patient has a small dorsal carpal ganglion cyst  She has full wrist range of motion with no instability there is no increased warmth or erythema  She has adequate range of motion adequate motor strength  Neurological:  No focal defect; cranial nerves 2-12 grossly intact  Psychiatric/behavioral:  Mood and behavior normal      X-Ray Wrist Complete 3 views Right  Narrative: EXAMINATION:  XR WRIST COMPLETE 3 VIEWS RIGHT    CLINICAL HISTORY:  Pain in right wrist    TECHNIQUE:  PA, lateral, and oblique views of the right wrist were performed.    COMPARISON:  None    FINDINGS:  No obvious fracture or dislocation is noted.  Osseous structures appear well mineralized and well aligned.  Impression: See above    Electronically signed by: Dennis Ventura MD  Date:    06/20/2023  Time:    09:52       My Radiographs Findings:    I have personally reviewed radiographs and concur with above findings    Assessment:       Encounter Diagnoses   Name Primary?    Right wrist pain     Ganglion cyst of dorsum of right wrist Yes         Plan:       I  have discussed medical condition treatment options with her at length I have discussed anti-inflammatory medicines intermittent wrist bracing compressive wrapping.  Cyst is a bit small for me to attempt aspiration at this point and with her symptoms persisting despite aspiration in the past I would be hesitant this point offer elective surgical excision.  If however pain swelling worsen I would be happy to re-evaluate at any time.  Follow up can be as needed.        History reviewed. No pertinent past medical history.  History reviewed. No pertinent surgical history.      Current Outpatient Medications:     meloxicam (MOBIC) 15 MG tablet, Take 1 tablet (15 mg total) by mouth once daily., Disp: 30 tablet, Rfl: 0    norgestimate-ethinyl estradioL (NORMA) 0.25-35 mg-mcg per tablet, Take 1 tablet by mouth once daily., Disp: 28 tablet, Rfl: 11    Review of patient's allergies indicates:  No Known Allergies    History reviewed. No pertinent family history.  Social History     Occupational History    Not on file   Tobacco Use    Smoking status: Never    Smokeless tobacco: Never   Substance and Sexual Activity    Alcohol use: Yes     Comment: socially     Drug use: Never    Sexual activity: Yes     Partners: Male     Answers submitted by the patient for this visit:  Orthopedics Questionnaire (Submitted on 6/22/2023)  unexpected weight change: No  appetite change : No  sleep disturbance: No  IMMUNOCOMPROMISED: No  dysphoric mood: No  sinus pressure : No  food allergies: No  difficulty urinating: No  painful intercourse: No  blood in stool: No   (Submitted on 6/22/2023)  Chief Complaint: Hand injury  Radiating Pain: Yes

## 2023-07-18 DIAGNOSIS — M25.531 RIGHT WRIST PAIN: ICD-10-CM

## 2023-07-18 RX ORDER — MELOXICAM 15 MG/1
TABLET ORAL
Qty: 30 TABLET | Refills: 0 | Status: SHIPPED | OUTPATIENT
Start: 2023-07-18 | End: 2023-09-19

## 2023-09-19 ENCOUNTER — OFFICE VISIT (OUTPATIENT)
Dept: FAMILY MEDICINE | Facility: CLINIC | Age: 23
End: 2023-09-19
Payer: COMMERCIAL

## 2023-09-19 ENCOUNTER — TELEPHONE (OUTPATIENT)
Dept: FAMILY MEDICINE | Facility: CLINIC | Age: 23
End: 2023-09-19
Payer: COMMERCIAL

## 2023-09-19 ENCOUNTER — PATIENT MESSAGE (OUTPATIENT)
Dept: FAMILY MEDICINE | Facility: CLINIC | Age: 23
End: 2023-09-19

## 2023-09-19 VITALS
DIASTOLIC BLOOD PRESSURE: 62 MMHG | RESPIRATION RATE: 18 BRPM | BODY MASS INDEX: 23.32 KG/M2 | HEIGHT: 63 IN | OXYGEN SATURATION: 99 % | HEART RATE: 90 BPM | TEMPERATURE: 99 F | WEIGHT: 131.63 LBS | SYSTOLIC BLOOD PRESSURE: 128 MMHG

## 2023-09-19 DIAGNOSIS — J03.90 ACUTE TONSILLITIS, UNSPECIFIED ETIOLOGY: ICD-10-CM

## 2023-09-19 DIAGNOSIS — J02.9 SORE THROAT: Primary | ICD-10-CM

## 2023-09-19 LAB
CTP QC/QA: YES
GROUP A STREP, MOLECULAR: POSITIVE
SARS-COV-2 RDRP RESP QL NAA+PROBE: NEGATIVE

## 2023-09-19 PROCEDURE — 99213 OFFICE O/P EST LOW 20 MIN: CPT | Mod: S$GLB,,, | Performed by: FAMILY MEDICINE

## 2023-09-19 PROCEDURE — 99999 PR PBB SHADOW E&M-EST. PATIENT-LVL IV: ICD-10-PCS | Mod: PBBFAC,,, | Performed by: FAMILY MEDICINE

## 2023-09-19 PROCEDURE — 87651 STREP A DNA AMP PROBE: CPT | Mod: PO | Performed by: FAMILY MEDICINE

## 2023-09-19 PROCEDURE — 87635: ICD-10-PCS | Mod: QW,S$GLB,, | Performed by: FAMILY MEDICINE

## 2023-09-19 PROCEDURE — 87635 SARS-COV-2 COVID-19 AMP PRB: CPT | Mod: QW,S$GLB,, | Performed by: FAMILY MEDICINE

## 2023-09-19 PROCEDURE — 99213 PR OFFICE/OUTPT VISIT, EST, LEVL III, 20-29 MIN: ICD-10-PCS | Mod: S$GLB,,, | Performed by: FAMILY MEDICINE

## 2023-09-19 PROCEDURE — 99999 PR PBB SHADOW E&M-EST. PATIENT-LVL IV: CPT | Mod: PBBFAC,,, | Performed by: FAMILY MEDICINE

## 2023-09-19 RX ORDER — CEFDINIR 300 MG/1
300 CAPSULE ORAL 2 TIMES DAILY
Qty: 20 CAPSULE | Refills: 0 | Status: SHIPPED | OUTPATIENT
Start: 2023-09-19 | End: 2023-09-29

## 2023-09-19 NOTE — PROGRESS NOTES
Subjective:       Patient ID: Iris Coronado is a 22 y.o. female.    Chief Complaint: No chief complaint on file.    New to me patient here for UC visit.  Onset yesterday with sore throat; worse today; more on left.  Left neck and ear pain as well.  Sinus pressure.  No fever, SOB or pl pain.  Upper abdomen feels like pressure/discomfort.  No known exposure to ill people.  Covid Negative  here today.      Review of Systems   Constitutional:  Negative for fever.   HENT:  Positive for sore throat.    Respiratory:  Negative for shortness of breath.    Cardiovascular:  Negative for chest pain.   Gastrointestinal:  Positive for abdominal pain (epigastric pressure). Negative for nausea.   Skin:  Negative for rash.   All other systems reviewed and are negative.      Objective:      Physical Exam  Constitutional:       General: She is not in acute distress.     Appearance: She is well-developed.   HENT:      Right Ear: Tympanic membrane normal. Tympanic membrane is not erythematous.      Left Ear: Tympanic membrane normal. Tympanic membrane is not erythematous.      Nose: Mucosal edema present.      Right Sinus: No maxillary sinus tenderness.      Left Sinus: No maxillary sinus tenderness.      Mouth/Throat:      Pharynx: Posterior oropharyngeal erythema present.      Tonsils: No tonsillar exudate. 1+ on the right. 2+ on the left.   Cardiovascular:      Rate and Rhythm: Normal rate and regular rhythm.      Heart sounds: No murmur heard.  Pulmonary:      Effort: Pulmonary effort is normal.      Breath sounds: Normal breath sounds.   Musculoskeletal:      Cervical back: Neck supple.   Lymphadenopathy:      Cervical: No cervical adenopathy.   Skin:     General: Skin is warm and dry.         Assessment:       1. Sore throat    2. Acute tonsillitis, unspecified etiology        Plan:       Sore throat  -     POCT COVID-19 Rapid Screening  -     Group A Strep, Molecular    Acute tonsillitis, unspecified etiology  -     cefdinir  (OMNICEF) 300 MG capsule; Take 1 capsule (300 mg total) by mouth 2 (two) times daily. for 10 days  Dispense: 20 capsule; Refill: 0      Patient Instructions   Meds as Rx'ed; Contact me if any worsening or for any new concerns as we discussed.

## 2023-09-20 ENCOUNTER — TELEPHONE (OUTPATIENT)
Dept: FAMILY MEDICINE | Facility: CLINIC | Age: 23
End: 2023-09-20
Payer: COMMERCIAL

## 2023-09-20 NOTE — TELEPHONE ENCOUNTER
----- Message from Iesha Wilson sent at 9/19/2023  2:58 PM CDT -----  Contact: Patient  Type:  Needs Medical Advice    Who Called:   Patient    Pharmacy name and phone #:        [id number] - [facility] [address] [facility]  [address] [facility] [address]  Phone: [phone] Fax: [phone]    Would the patient rather a call back or a response via [facility]?   Call back  Best Call Back Number:   [phone]    Additional Information: States she has to speak with someone in the office - states she just left there and was told she tested positive for strep - states she has questions about work - please call to advise - thank you

## 2024-05-22 ENCOUNTER — OFFICE VISIT (OUTPATIENT)
Dept: FAMILY MEDICINE | Facility: CLINIC | Age: 24
End: 2024-05-22
Payer: COMMERCIAL

## 2024-05-22 VITALS
TEMPERATURE: 98 F | HEIGHT: 63 IN | DIASTOLIC BLOOD PRESSURE: 78 MMHG | HEART RATE: 116 BPM | OXYGEN SATURATION: 100 % | BODY MASS INDEX: 21.17 KG/M2 | SYSTOLIC BLOOD PRESSURE: 108 MMHG | WEIGHT: 119.5 LBS

## 2024-05-22 DIAGNOSIS — Z13.29 SCREENING FOR THYROID DISORDER: ICD-10-CM

## 2024-05-22 DIAGNOSIS — Z13.6 SCREENING, ISCHEMIC HEART DISEASE: ICD-10-CM

## 2024-05-22 DIAGNOSIS — Z30.9 ENCOUNTER FOR CONTRACEPTIVE MANAGEMENT, UNSPECIFIED TYPE: ICD-10-CM

## 2024-05-22 DIAGNOSIS — Z76.89 ENCOUNTER TO ESTABLISH CARE: Primary | ICD-10-CM

## 2024-05-22 PROCEDURE — 99204 OFFICE O/P NEW MOD 45 MIN: CPT | Mod: S$GLB,,, | Performed by: STUDENT IN AN ORGANIZED HEALTH CARE EDUCATION/TRAINING PROGRAM

## 2024-05-22 PROCEDURE — 99999 PR PBB SHADOW E&M-EST. PATIENT-LVL III: CPT | Mod: PBBFAC,,, | Performed by: STUDENT IN AN ORGANIZED HEALTH CARE EDUCATION/TRAINING PROGRAM

## 2024-05-22 RX ORDER — NORGESTIMATE AND ETHINYL ESTRADIOL 0.25-0.035
1 KIT ORAL DAILY
Qty: 90 TABLET | Refills: 4 | Status: SHIPPED | OUTPATIENT
Start: 2024-05-22 | End: 2025-05-22

## 2024-05-22 NOTE — PATIENT INSTRUCTIONS
Jose Simmons,     If you are due for any health screening(s) below please notify me so we can arrange them to be ordered and scheduled. Most healthy patients at your age complete them, but you are free to accept or refuse.     If you can't do it, I'll definitely understand. If you can, I'd certainly appreciate it!    All of your core healthy metrics are met.

## 2024-05-22 NOTE — PROGRESS NOTES
[facility] Note    Subjective:  Chief Complaint:   Chief Complaint   Patient presents with    Establish Care       History of Present Illness:  Iris is here for establishing care   OCP only daily medication     Recommend COVID vaccinations.     Screening  Health Maintenance         Date Due Completion Date    TETANUS VACCINE 07/31/2023 7/31/2013    COVID-19 Vaccine (3 - 2023-24 season) 09/01/2023 8/26/2021    Chlamydia Screening 05/24/2024 5/24/2023    Influenza Vaccine (Season Ended) 09/01/2024 1/14/2016    Pap Smear 05/24/2026 5/24/2023          Immunization History   Administered Date(s) Administered    COVID-19, MRNA, LN-S, PF (Pfizer) (Purple Cap) 08/05/2021, 08/26/2021    PPD Test 12/05/2022     The ASCVD Risk score (Arcenio WELDON, et al., 2019) failed to calculate for the following reasons:    The 2019 ASCVD risk score is only valid for ages 40 to 79    Allergies:  Review of patient's allergies indicates:  No Known Allergies    Home Medications:  Current Outpatient Medications on File Prior to Visit   Medication Sig    [DISCONTINUED] norgestimate-ethinyl estradioL (NORMA) 0.25-35 mg-mcg per tablet Take 1 tablet by mouth once daily.     No current facility-administered medications on file prior to visit.       No past medical history on file.  No past surgical history on file.  No family history on file.  Social History     Tobacco Use    Smoking status: Never    Smokeless tobacco: Never   Substance Use Topics    Alcohol use: Yes     Comment: socially     Drug use: Never            The patient's past medical history, surgical history, social history, family history, allergies and medications have been reviewed.    Review of Systems     10 point review of systems was conducted and only the pertinent positives and pertinent negatives are noted above in the HPI section.    Physical Examination  General appearance: alert, cooperative, no distress  HEENT: normocephalic, atraumatic, PERRLA   Neck:  "trachea midline, no LAD, no thyromegaly, no neck stiffness  Lungs: clear to auscultation, no wheezes, rales or rhonchi, symmetric air entry  Heart: normal rate, regular rhythm, normal S1, S2, no murmurs, rubs, clicks or gallops  Skin: No rashes or abnormal skin lesions, no apparent jaundice or bruising  Extremities: Full ROM of all extremities, peripheral pulses normal, no unilateral leg swelling or calf tenderness   Neurological:alert, oriented, normal speech, no new focal findings or movement disorder noted from baseline      BP Readings from Last 3 Encounters:   05/22/24 108/78   09/19/23 128/62   06/20/23 118/74     Wt Readings from Last 3 Encounters:   05/22/24 54.2 kg (119 lb 7.8 oz)   09/19/23 59.7 kg (131 lb 9.8 oz)   06/23/23 59.9 kg (132 lb 0.9 oz)     /78 (BP Location: Left arm, Patient Position: Sitting, BP Method: Medium (Manual))   Pulse (!) 116   Temp 98.3 °F (36.8 °C) (Oral)   Ht 5' 3" (1.6 m)   Wt 54.2 kg (119 lb 7.8 oz)   SpO2 100%   BMI 21.17 kg/m²    274}  Laboratory: I have reviewed old labs below:    274}    Lab Results   Component Value Date    WBC 8.00 12/15/2021    HGB 12.1 12/15/2021    HCT 36.3 (L) 12/15/2021    MCV 89 12/15/2021     12/15/2021     (L) 12/15/2021    K 3.6 12/15/2021     12/15/2021    CALCIUM 9.3 12/15/2021    CO2 21 (L) 12/15/2021    GLU 87 12/15/2021    BUN 8 12/15/2021    CREATININE 0.7 12/15/2021    ANIONGAP 9 12/15/2021    PROT 7.0 12/15/2021    ALBUMIN 3.9 12/15/2021    BILITOT 0.8 12/15/2021    ALKPHOS 66 12/15/2021    ALT 6 (L) 12/15/2021    AST 14 12/15/2021    CHOL 135 12/15/2021    TRIG 55 12/15/2021    HDL 52 12/15/2021    LDLCALC 72.0 12/15/2021      Lab reviewed by me: Particular labs of significance that I will monitor, workup, or treat to improve are mentioned below in diagnostic impression remarks.    Imaging/EKG: I have reviewed the pertinent results and my findings are noted in remarks.  274}    CC:   Chief Complaint "   Patient presents with    Establish Care        274}    Assessment/Plan  Iris Coronado is a 23 y.o. female who presents to clinic with:  1. Encounter to establish care    2. Encounter for contraceptive management, unspecified type    3. Screening for thyroid disorder    4. Screening, ischemic heart disease       274}  Diagnostic Impression Remarks       23 y.o. female who presents to clinic today for est care    This is the extent of this pleasant patient's concerns at this present time.  I also discussed the importance of close follow up to discuss labs, change or modify her medications if needed, monitor side effects, and further evaluation of medical problems.     Additional workup planned: see labs ordered below.    See below for labs and meds ordered with associated diagnosis      1. Encounter to establish care  - CBC Auto Differential; Future  - Comprehensive Metabolic Panel; Future  - Lipid Panel; Future  - TSH; Future    2. Encounter for contraceptive management, unspecified type  - norgestimate-ethinyl estradioL (NORMA) 0.25-35 mg-mcg per tablet; Take 1 tablet by mouth once daily.  Dispense: 90 tablet; Refill: 4  Uptodate on PAP. Tolerating well     3. Screening for thyroid disorder  - TSH; Future    4. Screening, ischemic heart disease  - Lipid Panel; Future          RTC annually or PRN     Leia Navas MD, [facility]   Family Medicine Physician  05/22/2024      If you are due for any health screening(s) below please notify me so we can arrange them to be ordered and scheduled to maintain your health.     You are up to date for your primary preventive health care, and there are no reminders at this time.                    The following information is provided to all patients.  This information is to help you find resources for any of the problems found today that may be affecting your health:                Living healthy guide: www.[facility].[place].gov       Understanding Diabetes: www.diabetes.org        Eating healthy: www.cdc.gov/healthyweight      [facility] home safety checklist: www.cdc.gov/steadi/patient.html      Agency on Aging: www.goea.[place].gov       Alcoholics anonymous (AA): www.aa.org      Physical Activity: www.gael.nih.gov/uw9gacc       Tobacco use: www.quitwithusla.org

## 2024-05-30 ENCOUNTER — LAB VISIT (OUTPATIENT)
Dept: LAB | Facility: HOSPITAL | Age: 24
End: 2024-05-30
Attending: STUDENT IN AN ORGANIZED HEALTH CARE EDUCATION/TRAINING PROGRAM
Payer: COMMERCIAL

## 2024-05-30 DIAGNOSIS — Z13.29 SCREENING FOR THYROID DISORDER: ICD-10-CM

## 2024-05-30 DIAGNOSIS — Z13.6 SCREENING, ISCHEMIC HEART DISEASE: ICD-10-CM

## 2024-05-30 DIAGNOSIS — Z76.89 ENCOUNTER TO ESTABLISH CARE: ICD-10-CM

## 2024-05-30 LAB
ALBUMIN SERPL BCP-MCNC: 4.2 G/DL (ref 3.5–5.2)
ALP SERPL-CCNC: 76 U/L (ref 55–135)
ALT SERPL W/O P-5'-P-CCNC: 31 U/L (ref 10–44)
ANION GAP SERPL CALC-SCNC: 11 MMOL/L (ref 8–16)
AST SERPL-CCNC: 37 U/L (ref 10–40)
BASOPHILS # BLD AUTO: 0.05 K/UL (ref 0–0.2)
BASOPHILS NFR BLD: 0.9 % (ref 0–1.9)
BILIRUB SERPL-MCNC: 0.7 MG/DL (ref 0.1–1)
BUN SERPL-MCNC: 9 MG/DL (ref 6–20)
CALCIUM SERPL-MCNC: 9.6 MG/DL (ref 8.7–10.5)
CHLORIDE SERPL-SCNC: 110 MMOL/L (ref 95–110)
CHOLEST SERPL-MCNC: 127 MG/DL (ref 120–199)
CHOLEST/HDLC SERPL: 2.4 {RATIO} (ref 2–5)
CO2 SERPL-SCNC: 19 MMOL/L (ref 23–29)
CREAT SERPL-MCNC: 0.8 MG/DL (ref 0.5–1.4)
DIFFERENTIAL METHOD BLD: ABNORMAL
EOSINOPHIL # BLD AUTO: 0.8 K/UL (ref 0–0.5)
EOSINOPHIL NFR BLD: 13.9 % (ref 0–8)
ERYTHROCYTE [DISTWIDTH] IN BLOOD BY AUTOMATED COUNT: 12.5 % (ref 11.5–14.5)
EST. GFR  (NO RACE VARIABLE): >60 ML/MIN/1.73 M^2
GLUCOSE SERPL-MCNC: 89 MG/DL (ref 70–110)
HCT VFR BLD AUTO: 36.1 % (ref 37–48.5)
HDLC SERPL-MCNC: 52 MG/DL (ref 40–75)
HDLC SERPL: 40.9 % (ref 20–50)
HGB BLD-MCNC: 11.8 G/DL (ref 12–16)
IMM GRANULOCYTES # BLD AUTO: 0.01 K/UL (ref 0–0.04)
IMM GRANULOCYTES NFR BLD AUTO: 0.2 % (ref 0–0.5)
LDLC SERPL CALC-MCNC: 66.6 MG/DL (ref 63–159)
LYMPHOCYTES # BLD AUTO: 2 K/UL (ref 1–4.8)
LYMPHOCYTES NFR BLD: 35.4 % (ref 18–48)
MCH RBC QN AUTO: 30.1 PG (ref 27–31)
MCHC RBC AUTO-ENTMCNC: 32.7 G/DL (ref 32–36)
MCV RBC AUTO: 92 FL (ref 82–98)
MONOCYTES # BLD AUTO: 0.5 K/UL (ref 0.3–1)
MONOCYTES NFR BLD: 8.2 % (ref 4–15)
NEUTROPHILS # BLD AUTO: 2.3 K/UL (ref 1.8–7.7)
NEUTROPHILS NFR BLD: 41.4 % (ref 38–73)
NONHDLC SERPL-MCNC: 75 MG/DL
NRBC BLD-RTO: 0 /100 WBC
PLATELET # BLD AUTO: 211 K/UL (ref 150–450)
PMV BLD AUTO: 12.1 FL (ref 9.2–12.9)
POTASSIUM SERPL-SCNC: 3.9 MMOL/L (ref 3.5–5.1)
PROT SERPL-MCNC: 7.2 G/DL (ref 6–8.4)
RBC # BLD AUTO: 3.92 M/UL (ref 4–5.4)
SODIUM SERPL-SCNC: 140 MMOL/L (ref 136–145)
TRIGL SERPL-MCNC: 42 MG/DL (ref 30–150)
TSH SERPL DL<=0.005 MIU/L-ACNC: 1.93 UIU/ML (ref 0.4–4)
WBC # BLD AUTO: 5.62 K/UL (ref 3.9–12.7)

## 2024-05-30 PROCEDURE — 80053 COMPREHEN METABOLIC PANEL: CPT | Performed by: STUDENT IN AN ORGANIZED HEALTH CARE EDUCATION/TRAINING PROGRAM

## 2024-05-30 PROCEDURE — 80061 LIPID PANEL: CPT | Performed by: STUDENT IN AN ORGANIZED HEALTH CARE EDUCATION/TRAINING PROGRAM

## 2024-05-30 PROCEDURE — 84443 ASSAY THYROID STIM HORMONE: CPT | Performed by: STUDENT IN AN ORGANIZED HEALTH CARE EDUCATION/TRAINING PROGRAM

## 2024-05-30 PROCEDURE — 36415 COLL VENOUS BLD VENIPUNCTURE: CPT | Mod: PO | Performed by: STUDENT IN AN ORGANIZED HEALTH CARE EDUCATION/TRAINING PROGRAM

## 2024-05-30 PROCEDURE — 85025 COMPLETE CBC W/AUTO DIFF WBC: CPT | Performed by: STUDENT IN AN ORGANIZED HEALTH CARE EDUCATION/TRAINING PROGRAM

## 2025-01-03 ENCOUNTER — LAB VISIT (OUTPATIENT)
Dept: LAB | Facility: HOSPITAL | Age: 25
End: 2025-01-03
Attending: FAMILY MEDICINE
Payer: COMMERCIAL

## 2025-01-03 ENCOUNTER — OFFICE VISIT (OUTPATIENT)
Dept: OBSTETRICS AND GYNECOLOGY | Facility: CLINIC | Age: 25
End: 2025-01-03
Payer: COMMERCIAL

## 2025-01-03 VITALS
DIASTOLIC BLOOD PRESSURE: 66 MMHG | OXYGEN SATURATION: 99 % | WEIGHT: 129.63 LBS | HEIGHT: 63 IN | SYSTOLIC BLOOD PRESSURE: 128 MMHG | BODY MASS INDEX: 22.97 KG/M2 | HEART RATE: 100 BPM

## 2025-01-03 DIAGNOSIS — Z32.00 ENCOUNTER FOR CONFIRMATION OF PREGNANCY TEST RESULT WITH PHYSICAL EXAMINATION: ICD-10-CM

## 2025-01-03 DIAGNOSIS — Z32.00 ENCOUNTER FOR CONFIRMATION OF PREGNANCY TEST RESULT WITH PHYSICAL EXAMINATION: Primary | ICD-10-CM

## 2025-01-03 LAB
ABO + RH BLD: NORMAL
B-HCG UR QL: POSITIVE
BASOPHILS # BLD AUTO: 0.02 K/UL (ref 0–0.2)
BASOPHILS NFR BLD: 0.3 % (ref 0–1.9)
BLD GP AB SCN CELLS X3 SERPL QL: NORMAL
CTP QC/QA: YES
DIFFERENTIAL METHOD BLD: ABNORMAL
EOSINOPHIL # BLD AUTO: 0.3 K/UL (ref 0–0.5)
EOSINOPHIL NFR BLD: 3.9 % (ref 0–8)
ERYTHROCYTE [DISTWIDTH] IN BLOOD BY AUTOMATED COUNT: 12 % (ref 11.5–14.5)
HCT VFR BLD AUTO: 31.4 % (ref 37–48.5)
HCV AB SERPL QL IA: NEGATIVE
HGB BLD-MCNC: 10.6 G/DL (ref 12–16)
HIV 1+2 AB+HIV1 P24 AG SERPL QL IA: NEGATIVE
IMM GRANULOCYTES # BLD AUTO: 0.01 K/UL (ref 0–0.04)
IMM GRANULOCYTES NFR BLD AUTO: 0.1 % (ref 0–0.5)
LYMPHOCYTES # BLD AUTO: 1.7 K/UL (ref 1–4.8)
LYMPHOCYTES NFR BLD: 25.6 % (ref 18–48)
MCH RBC QN AUTO: 31.2 PG (ref 27–31)
MCHC RBC AUTO-ENTMCNC: 33.8 G/DL (ref 32–36)
MCV RBC AUTO: 92 FL (ref 82–98)
MONOCYTES # BLD AUTO: 0.5 K/UL (ref 0.3–1)
MONOCYTES NFR BLD: 7.5 % (ref 4–15)
NEUTROPHILS # BLD AUTO: 4.2 K/UL (ref 1.8–7.7)
NEUTROPHILS NFR BLD: 62.6 % (ref 38–73)
NRBC BLD-RTO: 0 /100 WBC
PLATELET # BLD AUTO: 234 K/UL (ref 150–450)
PMV BLD AUTO: 9.8 FL (ref 9.2–12.9)
RBC # BLD AUTO: 3.4 M/UL (ref 4–5.4)
WBC # BLD AUTO: 6.69 K/UL (ref 3.9–12.7)

## 2025-01-03 PROCEDURE — 86593 SYPHILIS TEST NON-TREP QUANT: CPT | Performed by: FAMILY MEDICINE

## 2025-01-03 PROCEDURE — 86850 RBC ANTIBODY SCREEN: CPT | Performed by: FAMILY MEDICINE

## 2025-01-03 PROCEDURE — 85025 COMPLETE CBC W/AUTO DIFF WBC: CPT | Performed by: FAMILY MEDICINE

## 2025-01-03 PROCEDURE — 87389 HIV-1 AG W/HIV-1&-2 AB AG IA: CPT | Performed by: FAMILY MEDICINE

## 2025-01-03 PROCEDURE — 83021 HEMOGLOBIN CHROMOTOGRAPHY: CPT | Performed by: FAMILY MEDICINE

## 2025-01-03 PROCEDURE — 87340 HEPATITIS B SURFACE AG IA: CPT | Performed by: FAMILY MEDICINE

## 2025-01-03 PROCEDURE — 99999 PR PBB SHADOW E&M-EST. PATIENT-LVL III: CPT | Mod: PBBFAC,,, | Performed by: FAMILY MEDICINE

## 2025-01-03 PROCEDURE — 86803 HEPATITIS C AB TEST: CPT | Performed by: FAMILY MEDICINE

## 2025-01-03 PROCEDURE — 36415 COLL VENOUS BLD VENIPUNCTURE: CPT | Performed by: FAMILY MEDICINE

## 2025-01-03 PROCEDURE — 86787 VARICELLA-ZOSTER ANTIBODY: CPT | Performed by: FAMILY MEDICINE

## 2025-01-03 PROCEDURE — 87086 URINE CULTURE/COLONY COUNT: CPT | Performed by: FAMILY MEDICINE

## 2025-01-03 PROCEDURE — 86762 RUBELLA ANTIBODY: CPT | Performed by: FAMILY MEDICINE

## 2025-01-03 NOTE — PROGRESS NOTES
Iris Coronado  24 y.o.  MRN  [id number] PATIENT   2000   FINAL EDC:   ___   by ___    Baby: ___    SO Name: Junior ALLERGIES:    NKDA      GBS: ___  Date: ___ OB PROBLEM LIST:       TO-DO THROUGHOUT PREGNANCY:  Depression Screen: ___  Circumcision: ___  Rhogam: ___  Flu Shot: ___  TDAP: ___  Infant feeding: ___   Plans for epidural: ___  Classes at hospital: ___  PP contraception: ___  Delivery Consent: ___  TOLAC counseling: ___  BTL counseling: ___  Pediatrician: ___ MEDICATIONS:    PNV   TODAY'S PROGRESS NOTE    2025    OB INTAKE APPOINTMENT  Iris Coronado is a 24 y.o. who presents today for her OB Intake Appointment.    She denies any problems so far.       PREGNANCY DATING:    Pregnancy test today = positive  LMP 10/24/24 ---gives EDC---> 25 ----> 13+2 wks EGA today  DT'S: 177 bpm by doppler    Sure LMP: No  Prior US done: No  Other information relevant to dating (sure conception date, IVF date, etc.): No     CARRIER SCREENING PREVIOUSLY DONE:  Cystic Fibrosis, Spinal Muscular Atrophy, Fragile X:  Not previously tested  Hemoglobinopathies: Not previously tested     FAMILY LINEAGE AND HISTORY:  Ashkenazi or North American Rastafarian:  No  Intellectual disability:  No  Premature ovarian failure (<40yoa):  No  Cajun or Iraqi Austrian:  No    MISCELLANEOUS:  Does the patient have cats? No    MEDICATIONS:  Current Outpatient Medications   Medication Instructions    prenatal no115/iron/folic acid (PRENATAL 19 ORAL) Take by mouth.       --------------------------------------------------------------------------------     ASSESMENT & PLAN:  Pregnancy confirmed today with a positive pregnancy test.  Patient's medical history was obtained today.    A first trimester dating ultrasound was ordered and scheduled (ideally done between 8 and 10wks).  Gave patient our OB Welcome Packet and reviewed its contents.  Our discussion included:  an overview of appointments, hydration / nutrition / weight  "gain advice, safe OTC medications, what substances and exposures to avoid, vaccine recommendations, advice for morning sickness, dental hygiene advice, recommendations regarding exercise, advice for travel in pregnancy, information about breastfeeding, postpartum birth control, and circumcision, pediatricians in the community, WIC enrollment, how to contact us for concerns or problems during pregnancy, warning or danger signs.  Also provided [facility]'s publication, "Your Pregnancy from A-Z."    Advised patient to enroll in [employer] if not already done.    Medication management:.  Advised patient to start a prenatal vitamin if not already taking.  It should contain 600mcg folic acid, 27mg iron, and 200mg DHA.     Genetic and Carrier Screening options were discussed in detail with the patient.  Once her EDC is confirmed, she may go to [facility] for the test(s) if desired @ 9wks or greater.  She was encouraged to review the detailed information available in the OB Welcome Packet.    The patient was directed to the lab for  Routine OB labs    PAP smear: up to date    SAB precautions reviewed    Timing of next clinic appointment will be determined by dating ultrasound.  Will send patient a message in [employer].    Jessica Ordoñez NP     LABS   INITIAL LABS:    Use LNOB (for Epic auto-fill)  Use LLWOBLABS (for manual entry) OTHER LABS:    Use L28W (for Epic auto-fill)  Use LLWOBLABS (for manual entry)   ULTRASOUNDS   ANATOMY SCAN:    Use LLWOANATOMYSCAN      HISTORY   MEDICAL HISTORY:    Pre-pregnancy BMI = 22   SURGICAL HISTORY:    none       OBSTETRIC HISTORY   Month/Year Mode of Delivery EGA Wt. M/F Epidural Complications / Comments   G1                                               SOCIAL HISTORY:    Smoker: non-smoker  Alcohol: yes but not since +HCG  Drugs: denies  Relationship: relationship with FOB  Domestic Violence: no  Lives with: Boyfriend  Education Level: Undergraduate Degree  Occupation:  Dental " assistant  Anabaptist: . GYN HISTORY:    PAP'S: no h/o abnormals  LAST PAP: PAP neg / Date: 5/30/2023  STD Hx: chlamydia  GENITAL HSV: no FAMILY HISTORY:    HTN: no (dad)  DIABETES: no (mom)  BLEEDING D/O: no  CLOTTING D/O: no  BIRTH DEFECTS: no  MENTAL DISABILITY: no  GYN CANCER: no         Follow up for US soon and 1st OB appointment after.   Future Appointments   Date Time Provider Department Center   1/3/2025  2:50 PM LAB, [facility] [place]   1/6/2025  8:00 AM ULTRASOUND, [facility] [place] [facility]   1/8/2025  1:00 PM Lay Cain MD [facility] [place] [facility]       New patient: In excessive of 60 minutes  total time spent for evaluation and management services. Time included elements of the following: time spent preparing to see patient, obtaining and reviewing separately obtained history, exam, evaluation, counseling and education of patient/family member or care giver, documenting in the HMR, independently interpreting results and communication of results, coordination of care ordering medications, tests, or procedures, referral and communication to other health care professionals.

## 2025-01-04 LAB
HBV SURFACE AG SERPL QL IA: NEGATIVE
TREPONEMA PALLIDUM IGG+IGM AB [PRESENCE] IN SERUM OR PLASMA BY IMMUNOASSAY: NONREACTIVE

## 2025-01-06 ENCOUNTER — HOSPITAL ENCOUNTER (OUTPATIENT)
Dept: OBSTETRICS AND GYNECOLOGY | Facility: CLINIC | Age: 25
Discharge: HOME | End: 2025-01-06

## 2025-01-06 DIAGNOSIS — Z32.00 ENCOUNTER FOR CONFIRMATION OF PREGNANCY TEST RESULT WITH PHYSICAL EXAMINATION: ICD-10-CM

## 2025-01-06 LAB
RUBV IGG SERPL IA-ACNC: 3.9
RUBV IGG SERPL QL IA: POSITIVE
VZV IGG SER IA-ACNC: 2.2
VZV IGG SER QL IA: POSITIVE

## 2025-01-06 NOTE — PROGRESS NOTES
Based on your ultrasound, your final due date is 8/10/25.    Your next office visit is on 1/8/25 with Dr. Cain as previously scheduled.     If you plan to have either or both of the optional genetic screening tests,we will order this as your next office visit.

## 2025-01-07 LAB
HB ELECTROPHORESIS INTERP CANCEL: NORMAL
HB ELECTROPHORESIS INTERPRETATION: NORMAL
HGB A MFR BLD ELPH: 97.5 % (ref 95.8–98)
HGB A2 MFR BLD: 2.5 % (ref 2–3.3)
HGB A2+XXX MFR BLD ELPH: NORMAL %
HGB F MFR BLD: 0 % (ref 0–0.9)
HGB XXX MFR BLD ELPH: NORMAL %
HPLC HB VARIANT: NORMAL

## 2025-01-08 ENCOUNTER — INITIAL PRENATAL (OUTPATIENT)
Dept: OBSTETRICS AND GYNECOLOGY | Facility: CLINIC | Age: 25
End: 2025-01-08
Payer: COMMERCIAL

## 2025-01-08 VITALS
SYSTOLIC BLOOD PRESSURE: 124 MMHG | DIASTOLIC BLOOD PRESSURE: 58 MMHG | HEART RATE: 75 BPM | BODY MASS INDEX: 23.31 KG/M2 | WEIGHT: 131.63 LBS

## 2025-01-08 DIAGNOSIS — O99.011 ANEMIA AFFECTING PREGNANCY IN FIRST TRIMESTER: ICD-10-CM

## 2025-01-08 DIAGNOSIS — Z31.430 ENCOUNTER OF FEMALE FOR TESTING FOR GENETIC DISEASE CARRIER STATUS FOR PROCREATIVE MANAGEMENT: ICD-10-CM

## 2025-01-08 DIAGNOSIS — Z11.3 SCREENING EXAMINATION FOR VENEREAL DISEASE: ICD-10-CM

## 2025-01-08 DIAGNOSIS — Z23 NEED FOR INFLUENZA VACCINATION: ICD-10-CM

## 2025-01-08 DIAGNOSIS — Z34.01 ENCOUNTER FOR SUPERVISION OF NORMAL FIRST PREGNANCY IN FIRST TRIMESTER: Primary | ICD-10-CM

## 2025-01-08 PROCEDURE — 99999 PR PBB SHADOW E&M-EST. PATIENT-LVL III: CPT | Mod: PBBFAC,,, | Performed by: STUDENT IN AN ORGANIZED HEALTH CARE EDUCATION/TRAINING PROGRAM

## 2025-01-08 PROCEDURE — 81515 NFCT DS BV&VAGINITIS DNA ALG: CPT | Performed by: STUDENT IN AN ORGANIZED HEALTH CARE EDUCATION/TRAINING PROGRAM

## 2025-01-08 PROCEDURE — 87591 N.GONORRHOEAE DNA AMP PROB: CPT | Performed by: STUDENT IN AN ORGANIZED HEALTH CARE EDUCATION/TRAINING PROGRAM

## 2025-01-08 RX ORDER — FERROUS SULFATE 325(65) MG
325 TABLET ORAL
Qty: 30 TABLET | Refills: 2 | Status: SHIPPED | OUTPATIENT
Start: 2025-01-08

## 2025-01-08 NOTE — PATIENT INSTRUCTIONS
Have a question or concern?    PRO TIP: Program these phone numbers in your cell phone!    for an emergency  call 911 or go to the nearest hospital Especially after 20 weeks of the pregnancy, please remember that  Labor & Delivery is at [facility].  There is no L[facility]D at [facility]   [facility] Nurse Care Advice Line  [phone] At any time during your pregnancy,  you can speak to a nurse 24-7.   for non-urgent issues, send us a  message in [employer] Consider calling the Nurse Care Advice Line if it's a weekend or  toward the end of the work-day since  [employer] and phone messages may not be answered for a day or two.   for non-urgent issues, call the clinic  [phone], Option 3    Labor & Delivery  [phone] Starting at 20 weeks of the pregnancy,  you can speak to a nurse on L&D 24-7.       FIRST TRIMESTER  0 - 13+6 weeks    Adapting to Pregnancy: First Trimester   As your body adjusts, you may have to change or limit your daily activities. You'll need more rest. You may also need to use the energy you have more wisely.     Your Changing Body   Almost every part of your body is affected as you adapt to pregnancy. You may not look very pregnant during the first three months, but you are likely to have some common signs of early pregnancy: Nausea, Fatigue, Frequent urination, Mood swings, Bloating of the abdomen, Nipple or breast tenderness, Breast swelling.    It's Not Too Late to Start Good Habits   What matters most is protecting your baby from this moment on. If you smoke, drink alcohol, or use drugs, now is the time to stop. If you need help, talk with your health care provider.   Smoking increases the risk of miscarriage or having a low-birth-weight baby. If you smoke, quit now.   Alcohol and drugs like marijuana have been linked to miscarriage, birth defects, intellectual disability, and low birth weight. Do not drink alcohol or use drugs.    Tips to Relieve Nausea    There's lots more information in your OB Welcome Packet, but here are a few ideas:  Eat small, light meals at frequent intervals.   Get up slowly. Eat a few unsalted crackers before you get out of bed.   Drink water with lemon slices.   Eat an ice pop in your favorite flavor.   Ask your health care provider about taking martha or vitamin B6 for nausea and vomiting.   Talk with your health care provider if you take vitamins that upset your stomach.    Advice for Travel   Talk to your health care provider first, but the second trimester may be the best time for travel. You may be advised to avoid certain trips while you're pregnant. Food and water can be concerns in developing countries. Travel by car is a good choice since you can stop, get out, and stretch (should be done at least every 2 hours). Bring snacks and water along. Fasten the lap belt below your belly, low over your hips. Also be sure to wear the shoulder harness.   We usually recommend no flying beyond 35 completed weeks (35+6).    Intimacy   Unless your health care provider tells you to, there is no reason to stop having sex while you're pregnant. You or your partner may notice changes in desire. Desire may be less in the first trimester, due to nausea and fatigue. In the second trimester, sex may be very enjoyable. The third trimester can be a challenge comfort-wise. Try different positions and see what's best for you.    Baby!  Major organs and nervous system are developing, the heart starts beating, lungs begin development, bones appear, all the little parts start to form (head, face, eyes, ears, arms, fingers, legs, and toes), hair starts to grow, and 20 tooth-buds develop.      OTHER INFORMATION:  If your provider orders labs or other studies, you probably won't hear from us unless something is abnormal.  How we define normal is different for many things in pregnancy.  This includes several of the numbers on a Complete Blood Count (CBC).  If  your result is flagged as abnormal in [employer] but you don't hear from us, it's probably because things are actually normal based on where you are in your pregnancy.

## 2025-01-08 NOTE — PROGRESS NOTES
Iris Coronado  24 y.o.  MRN  [id number] PATIENT   2000   FINAL EDC:   8.10.25   by 9 wk US    Baby: ___    SO Name: Junior ALLERGIES:    NKDA      GBS: ___  Date: ___ OB PROBLEM LIST:       TO-DO THROUGHOUT PREGNANCY:  Depression Screen: ___  Circumcision: ___    Flu Shot: 25  TDAP: ___  Infant feeding: ___   Plans for epidural: ___  Classes at hospital: ___  PP contraception: ___  Delivery Consent: ___    BTL counseling: ___  Pediatrician: ___ MEDICATIONS:    PNV   TODAY'S PROGRESS NOTE    Subjective:      Iris Coronado is being seen today for her first obstetrical visit.  She is at 9w3d gestation by 9 wk US.  She has no complaints today.  She reports fatigue.  She denies any VB.    Her obstetrical history is significant for  nothing      History reviewed. No pertinent past medical history.     History reviewed. No pertinent surgical history.    Review of patient's allergies indicates:  No Known Allergies    Current Outpatient Medications   Medication Instructions    ferrous sulfate (FEOSOL) 325 mg, Oral, Every 48 hours, (Every other day)    prenatal no115/iron/folic acid (PRENATAL 19 ORAL) Take by mouth.        OB History    Para Term  AB Living   1 0 0 0 0 0   SAB IAB Ectopic Multiple Live Births   0 0 0 0 0      # Outcome Date GA Lbr [place]/2nd Weight Sex Type Anes PTL Lv   1 Current                Past GYN history:  Denies any STI or abnl Pap smears    No family history on file.   Denies any congenital/birth defects, DS, MR, SCD, CF, bleeding/clotting disorders    Social History     Tobacco Use    Smoking status: Never    Smokeless tobacco: Never   Substance Use Topics    Alcohol use: Yes     Comment: socially     Drug use: Never        Review of Systems      Review of Systems   Constitutional:  Positive for fatigue. Negative for chills and fever.   Eyes:  Negative for visual disturbance.   Respiratory:  Negative for cough and shortness of breath.    Cardiovascular:  Negative  for chest pain and palpitations.   Gastrointestinal:  Negative for abdominal pain, nausea and vomiting.   Genitourinary:  Negative for vaginal bleeding, vaginal discharge, vaginal pain and vaginal odor.   Neurological:  Negative for headaches.   Psychiatric/Behavioral:  Negative for depression and sleep disturbance. The patient is not nervous/anxious.    All other systems reviewed and are negative.  Breast: Negative for lump and mastodynia         Objective:     Vitals:    01/08/25 1315   BP: (!) 124/58   Pulse: 75   Weight: 59.7 kg (131 lb 9.8 oz)     FHT: 181 by Vscan    General Appearance:    Alert, cooperative, no distress, appears stated age   Head:    Normocephalic, without obvious abnormality, atraumatic   Eyes:    conjunctiva/corneas clear       Nose:   Nares normal, septum midline, no drainage or sinus tenderness   Throat:   Lips normal; teeth and gums normal   Neck:   Supple, symmetrical, trachea midline, no adenopathy;     thyroid:  no enlargement/tenderness/nodules;   Back:     Symmetric, no curvature, ROM normal, no CVA tenderness   Lungs:     Clear to auscultation bilaterally, respirations unlabored   Chest Wall:    No tenderness or deformity    Heart:    Regular rate and rhythm, no murmur,       Abdomen:     Soft, non-tender,  no masses, no organomegaly           Extremities:   normal, atraumatic, no cyanosis or edema       Skin:   Skin color, texture, turgor normal, no rashes or lesions   Lymph nodes:   Cervical, supraclavicular, inguinal and axillary nodes normal        Assessment:      Pregnancy at 9 and 3/7 weeks      Problem List Items Addressed This Visit    None  Visit Diagnoses       Encounter for supervision of normal first pregnancy in first trimester    -  Primary    Relevant Orders    [id number] Plus W/ Sca* T21, T18, T13 & Y + X    Encounter of female for testing for genetic disease carrier status for procreative management        Relevant Orders    Inheritest Core(CF97,SMA,FraX)     Anemia affecting pregnancy in first trimester        Relevant Medications    ferrous sulfate (FEOSOL) 325 mg (65 mg iron) Tab tablet    Need for influenza vaccination        Relevant Medications    influenza (Flulaval, Fluzone, Fluarix) 45 mcg/0.5 mL IM vaccine (> or = 6 mo) 0.5 mL             Plan:        Initial labs reviewed.  Prenatal vitamins.  1T precautions given  Flu shot given today  Fe Rx sent  Role of ultrasound in pregnancy discussed; fetal survey: requested.    Aspirin Questionnaire reviewed.  The patient is not a candidate for ASA 81mg daily for prevention of pre-eclampsia.  Sleep Apnea screening completed, and the patient is considered low-risk.  Reviewed Carrier and Aneuploidy Screening with the patient.  She requests the [id number] cfDNA test and Carrier Screening for SMA, CF, and Fragile X.  PAP smear: up to date; CT/NG testing: sent today (cervicovaginal)  RTC in 4 weeks    I spent a total of 36 minutes on the day of the visit.This includes face to face time and non-face to face time preparing to see the patient (eg, review of tests), obtaining and/or reviewing separately obtained history, documenting clinical information in the electronic or other health record, independently interpreting results and communicating results to the patient/family/caregiver, or care coordinator.                    LABS   INITIAL LABS:    DATE: 1/3/25  MBT: B positive  AB SCREEN: negative  TP-ABS: negative  RUBELLA: Immune  Hep B sAg: negative  Hep C Ab: negative  HIV: negative  H/H: 10.6 / 31.4  PLT: 234  VARICELLA: Immune  HGB:  cannot rule out alpha thal  URINE CX: negative    PAP: PAP neg / Date: 5.24.23    BEV/CHLAM/TRICH: collected today / Date: 1.8.25   OTHER LABS:    Use L28W (for Epic auto-fill)  Use LLWOBLABS (for manual entry)   ULTRASOUNDS   ANATOMY SCAN:    Use LLWOANATOMYSCAN      HISTORY   MEDICAL HISTORY:    Pre-pregnancy BMI = 22   SURGICAL HISTORY:    none       OBSTETRIC HISTORY   Month/Year Mode of  Delivery EGA Wt. M/F Epidural Complications / Comments   G1                                               SOCIAL HISTORY:    Smoker: non-smoker  Alcohol: yes but not since +HCG  Drugs: denies  Relationship: relationship with FOB  Domestic Violence: no  Lives with: Boyfriend  Education Level: Undergraduate Degree  Occupation:  Dental assistant  Yazdanism: . GYN HISTORY:    PAP'S: no h/o abnormals  LAST PAP: PAP neg / Date: 5/30/2023  STD Hx: chlamydia  GENITAL HSV: no FAMILY HISTORY:    HTN: no (dad)  DIABETES: no (mom)  BLEEDING D/O: no  CLOTTING D/O: no  BIRTH DEFECTS: no  MENTAL DISABILITY: no  GYN CANCER: no         No follow-ups on file.   Future Appointments   Date Time Provider Department Center   1/16/2025  7:10 AM [facility] LAB [place]   1/16/2025 10:40 AM LAB, [facility] [place]   2/7/2025  3:30 PM Lay Cain MD [facility] [place] [facility]         New patient: In excessive of 60 minutes  total time spent for evaluation and management services. Time included elements of the following: time spent preparing to see patient, obtaining and reviewing separately obtained history, exam, evaluation, counseling and education of patient/family member or care giver, documenting in the HMR, independently interpreting results and communication of results, coordination of care ordering medications, tests, or procedures, referral and communication to other health care professionals.

## 2025-01-08 NOTE — LETTER
January 8, 2025    Iris Coronado  [address] [address]              [facility]  [address]  [address]  [place] [address]  Phone: [phone]  Fax: [phone]      To Whom It May Concern:    Ms. Coronado is currently under our care for pregnancy.  Estimated Date of Delivery: 08/10/2025    Any elective dental work should preferably be scheduled during or after the mid-trimester or postpartum.    Dental procedures can be performed with local anesthesia without epinephrine. Recommended antibiotics include penicillins, erythromycin, and cephalosporins. Tylenol #3 or Percocet may be used for pain control. No x-rays are allowed for routine screening. For dental problems, up to four x-rays may be taken with proper abdominal shield.      Sincerely,      Aisha Aguiar MA

## 2025-01-10 LAB
BACTERIAL VAGINOSIS DNA: NOT DETECTED
C TRACH DNA SPEC QL NAA+PROBE: NOT DETECTED
CANDIDA GLABRATA/KRUSEI: NOT DETECTED
CANDIDA RRNA VAG QL PROBE: DETECTED
N GONORRHOEA DNA SPEC QL NAA+PROBE: NOT DETECTED
TRICHOMONAS VAGINALIS: NOT DETECTED

## 2025-02-07 ENCOUNTER — ROUTINE PRENATAL (OUTPATIENT)
Dept: OBSTETRICS AND GYNECOLOGY | Facility: CLINIC | Age: 25
End: 2025-02-07
Payer: COMMERCIAL

## 2025-02-07 VITALS
DIASTOLIC BLOOD PRESSURE: 74 MMHG | SYSTOLIC BLOOD PRESSURE: 136 MMHG | WEIGHT: 138.81 LBS | BODY MASS INDEX: 24.59 KG/M2 | HEART RATE: 98 BPM

## 2025-02-07 DIAGNOSIS — Z34.01 ENCOUNTER FOR SUPERVISION OF NORMAL FIRST PREGNANCY IN FIRST TRIMESTER: Primary | ICD-10-CM

## 2025-02-07 PROCEDURE — 99999 PR PBB SHADOW E&M-EST. PATIENT-LVL III: CPT | Mod: PBBFAC,,, | Performed by: STUDENT IN AN ORGANIZED HEALTH CARE EDUCATION/TRAINING PROGRAM

## 2025-02-07 PROCEDURE — 0502F SUBSEQUENT PRENATAL CARE: CPT | Mod: S$GLB,,, | Performed by: STUDENT IN AN ORGANIZED HEALTH CARE EDUCATION/TRAINING PROGRAM

## 2025-02-07 NOTE — PROGRESS NOTES
Iris Coronado  24 y.o.  MRN  [id number] PATIENT   2000   FINAL EDC:   8.10.25   by 9 wk US    Baby: ___    SO Name: Junior ALLERGIES:    NKDA      GBS: ___  Date: ___ OB PROBLEM LIST:       TO-DO THROUGHOUT PREGNANCY:  Depression Screen: ___  Circumcision: ___    Flu Shot: 25  TDAP: ___  Infant feeding: ___   Plans for epidural: ___  Classes at hospital: ___  PP contraception: ___  Delivery Consent: ___    BTL counseling: ___  Pediatrician: ___ MEDICATIONS:    PNV   TODAY'S PROGRESS NOTE    Patient is doing well today. She reports no complaints.     She denies vaginal bleeding.  She denies leakage of fluid.  She denies contractions or abdominal pain.  She denies pelvic pressure.   She denies headaches, vision changes, right upper quadrant pain, non-dependent edema.    Vitals:    25 1532   BP: 136/74   Pulse: 98   Weight: 63 kg (138 lb 13 oz)     FHT: 157    Assessment:   1. IUP at 13w5d    1. Encounter for supervision of normal first pregnancy in first trimester           Plan:    Return in 4 week  2T precautions given  Reviewed normal NIPT/Carrier screen                   LABS   INITIAL LABS:    DATE: 1/3/25  MBT: B positive  AB SCREEN: negative  TP-ABS: negative  RUBELLA: Immune  Hep B sAg: negative  Hep C Ab: negative  HIV: negative  H/H: 10.6 / 31.4  PLT: 234  VARICELLA: Immune  HGB:  cannot rule out alpha thal  URINE CX: negative    PAP: PAP neg / Date: 23    BEV/CHLAM/TRICH: collected today / Date: 25   OTHER LABS:    DATE: 25  cfDNA ([id number]): XY, neg for T13, T18, T21   CARRIER SCREEN (Inheritest Core): negative for CF, SMA, Fragile X      ULTRASOUNDS   ANATOMY SCAN:    Use LLWOANATOMYSCAN      HISTORY   MEDICAL HISTORY:    Pre-pregnancy BMI = 22   SURGICAL HISTORY:    none       OBSTETRIC HISTORY   Month/Year Mode of Delivery EGA Wt. M/F Epidural Complications / Comments   G1                                               SOCIAL HISTORY:    Smoker:  non-smoker  Alcohol: yes but not since +HCG  Drugs: denies  Relationship: relationship with FOB  Domestic Violence: no  Lives with: Boyfriend  Education Level: Undergraduate Degree  Occupation:  Dental assistant  Scientology: . GYN HISTORY:    PAP'S: no h/o abnormals  LAST PAP: PAP neg / Date: 5/30/2023  STD Hx: chlamydia  GENITAL HSV: no FAMILY HISTORY:    HTN: no (dad)  DIABETES: no (mom)  BLEEDING D/O: no  CLOTTING D/O: no  BIRTH DEFECTS: no  MENTAL DISABILITY: no  GYN CANCER: no

## 2025-02-07 NOTE — PATIENT INSTRUCTIONS
Have a question or concern?    PRO TIP: Program these phone numbers in your cell phone!    for an emergency  call 911 or go to the nearest hospital Especially after 20 weeks of the pregnancy, please remember that  Labor & Delivery is at [facility].  There is no L&D at [facility]   [facility] Nurse Care Advice Line  [phone] At any time during your pregnancy,  you can speak to a nurse 24-7.   for non-urgent issues, send us a  message in [employer] Consider calling the Nurse Care Advice Line if it's a weekend or  toward the end of the work-day since  [employer] and phone messages may not be answered for a day or two.   for non-urgent issues, call the clinic  [phone], Option 3    Labor & Delivery  [phone] Starting at 20 weeks of the pregnancy,  you can speak to a nurse on L&D 24-7.       FIRST TRIMESTER  0 - 13+6 weeks    Adapting to Pregnancy: First Trimester   As your body adjusts, you may have to change or limit your daily activities. You'll need more rest. You may also need to use the energy you have more wisely.     Your Changing Body   Almost every part of your body is affected as you adapt to pregnancy. You may not look very pregnant during the first three months, but you are likely to have some common signs of early pregnancy: Nausea, Fatigue, Frequent urination, Mood swings, Bloating of the abdomen, Nipple or breast tenderness, Breast swelling.    It's Not Too Late to Start Good Habits   What matters most is protecting your baby from this moment on. If you smoke, drink alcohol, or use drugs, now is the time to stop. If you need help, talk with your health care provider.   Smoking increases the risk of miscarriage or having a low-birth-weight baby. If you smoke, quit now.   Alcohol and drugs like marijuana have been linked to miscarriage, birth defects, intellectual disability, and low birth weight. Do not drink alcohol or use drugs.    Tips to Relieve Nausea    There's lots more information in your OB Welcome Packet, but here are a few ideas:  Eat small, light meals at frequent intervals.   Get up slowly. Eat a few unsalted crackers before you get out of bed.   Drink water with lemon slices.   Eat an ice pop in your favorite flavor.   Ask your health care provider about taking martha or vitamin B6 for nausea and vomiting.   Talk with your health care provider if you take vitamins that upset your stomach.    Advice for Travel   Talk to your health care provider first, but the second trimester may be the best time for travel. You may be advised to avoid certain trips while you're pregnant. Food and water can be concerns in developing countries. Travel by car is a good choice since you can stop, get out, and stretch (should be done at least every 2 hours). Bring snacks and water along. Fasten the lap belt below your belly, low over your hips. Also be sure to wear the shoulder harness.   We usually recommend no flying beyond 35 completed weeks (35+6).    Intimacy   Unless your health care provider tells you to, there is no reason to stop having sex while you're pregnant. You or your partner may notice changes in desire. Desire may be less in the first trimester, due to nausea and fatigue. In the second trimester, sex may be very enjoyable. The third trimester can be a challenge comfort-wise. Try different positions and see what's best for you.    Baby!  Major organs and nervous system are developing, the heart starts beating, lungs begin development, bones appear, all the little parts start to form (head, face, eyes, ears, arms, fingers, legs, and toes), hair starts to grow, and 20 tooth-buds develop.      OTHER INFORMATION:  If your provider orders labs or other studies, you probably won't hear from us unless something is abnormal.  How we define normal is different for many things in pregnancy.  This includes several of the numbers on a Complete Blood Count (CBC).  If  your result is flagged as abnormal in [employer] but you don't hear from us, it's probably because things are actually normal based on where you are in your pregnancy.

## 2025-02-23 ENCOUNTER — PATIENT MESSAGE (OUTPATIENT)
Dept: OTHER | Facility: OTHER | Age: 25
End: 2025-02-23
Payer: COMMERCIAL

## 2025-03-02 ENCOUNTER — PATIENT MESSAGE (OUTPATIENT)
Dept: OTHER | Facility: OTHER | Age: 25
End: 2025-03-02
Payer: COMMERCIAL

## 2025-03-12 ENCOUNTER — ROUTINE PRENATAL (OUTPATIENT)
Dept: OBSTETRICS AND GYNECOLOGY | Facility: CLINIC | Age: 25
End: 2025-03-12
Payer: COMMERCIAL

## 2025-03-12 VITALS
BODY MASS INDEX: 26.01 KG/M2 | HEART RATE: 86 BPM | DIASTOLIC BLOOD PRESSURE: 70 MMHG | WEIGHT: 146.81 LBS | SYSTOLIC BLOOD PRESSURE: 122 MMHG

## 2025-03-12 DIAGNOSIS — O99.012 ANEMIA AFFECTING PREGNANCY IN SECOND TRIMESTER: ICD-10-CM

## 2025-03-12 DIAGNOSIS — O99.011 ANEMIA AFFECTING PREGNANCY IN FIRST TRIMESTER: ICD-10-CM

## 2025-03-12 DIAGNOSIS — Z36.3 ENCOUNTER FOR ROUTINE SCREENING FOR MALFORMATION USING ULTRASOUND: ICD-10-CM

## 2025-03-12 DIAGNOSIS — Z3A.20 20 WEEKS GESTATION OF PREGNANCY: ICD-10-CM

## 2025-03-12 DIAGNOSIS — Z34.02 ENCOUNTER FOR SUPERVISION OF NORMAL FIRST PREGNANCY IN SECOND TRIMESTER: Primary | ICD-10-CM

## 2025-03-12 PROCEDURE — 0502F SUBSEQUENT PRENATAL CARE: CPT | Mod: S$GLB,,, | Performed by: STUDENT IN AN ORGANIZED HEALTH CARE EDUCATION/TRAINING PROGRAM

## 2025-03-12 PROCEDURE — 99999 PR PBB SHADOW E&M-EST. PATIENT-LVL III: CPT | Mod: PBBFAC,,, | Performed by: STUDENT IN AN ORGANIZED HEALTH CARE EDUCATION/TRAINING PROGRAM

## 2025-03-12 RX ORDER — FERROUS SULFATE 325(65) MG
325 TABLET ORAL
Qty: 30 TABLET | Refills: 11 | Status: SHIPPED | OUTPATIENT
Start: 2025-03-12

## 2025-03-12 NOTE — PATIENT INSTRUCTIONS
To schedule classes (see below for what's offered) at the hospital, call [phone].    Have a question or concern?    PRO TIP: Program these phone numbers in your cell phone!    for an emergency  call 911 or go to the nearest hospital Especially after 20 weeks of the pregnancy, please remember that  Labor & Delivery is at [facility].  There is no L&D at [facility] (formerly called [facility]).  After hours you can only access L&D through the Emergency Room (entrance on [facility]).   [facility] Nurse Care Advice Line  [phone] At any time during your pregnancy,  you can speak to a nurse 24-7.   for non-urgent issues, send us a  message in Status4 Consider calling the Nurse Care Advice Line if it's a weekend or  toward the end of the work-day since  Status4 and phone messages may not be answered for a day or two.   for non-urgent issues, call the clinic  [phone], Option 3    Labor & Delivery  [phone] Starting at 20 weeks of the pregnancy,  you can speak to a nurse on L&D 24-7.     SECOND TRIMESTER  14+0 - 28+6 weeks    Adapting to Pregnancy: Second Trimester   Keep up the healthy habits you started in your first trimester.  It's not too late to make better choices and drop bad habits - your baby's counting on you!  Most women enjoy this middle part of the pregnancy: first trimester fatigue and morning sickness are probably behind you, and your belly's not yet getting in the way physically.    Your To-Do List  There are several things you should start working on.  More information on these topics can be found in your OB Welcome Packet and the A-Z Book.    Choose a pediatrician for your baby.  Sign up for a tour and classes at the hospital.  All classes are free of charge if you are delivering at [facility].  Call [phone] to register for any of these classes:  Baby Love (learn about the delivery process and caring for a )  Big Brother / Big Sister Class (to help siblings prepare for  baby)  Lamaze (a 4 week class to learn about natural interventions for labor)  Breastfeeding (get a head start learning about breastfeeding)  Work on some methods for coping with the pains of labor.  A good bit of labor happens before you can get an epidural, and you'll feel more confident if you have a plan that you've practiced.  We encourage everyone to breastfeed if they can (and most women can!).  Please ask us questions if you have them.  Decide what you'd like to use for contraception (birth control) after this baby is born.  If you're having a boy, let us know if you plan to have him circumcised.    Pregnancy Milestones  After week 16, avoid lying on your back for more than a few minutes. Instead, lie on your side and switch sides often.  Between 19 and 22 weeks you'll have an ultrasound to look at the baby's anatomy and determine the gender (if you want to know and don't already know). This is around the same time when you should start feeling baby's movements and kicks.  Your gestational diabetes test will be done around 28 weeks.  We'll give you a TDAP booster shot so that your baby is protected from Whooping Cough (pertussis) his/her first few months of life.    When You Travel   The second trimester is a good time for travel. Talk to your health care provider about any special plans you may need to make. Always:   Wear a seat belt. Fasten the lap part under your belly. Wear the shoulder part also.   Take frequent breaks during long trips by car or plane. Move around to stretch your legs.   Drink plenty of fluids on flights. The air in plane cabins is very dry.   Avoid hot climates or high altitudes if you are not used to them.   Avoid places where the food and water might make you sick.    Intimacy  Unless your health care provider tells you otherwise, it's perfectly fine to continue having sex. In fact, many women find sex in the second trimester quite enjoyable due to increased blood supply to the  pelvic area.    Baby!  Organs continue to develop and begin to function, there's formation of eyebrows / eyelashes / fingernails, skin is wrinkled and covered in vernix, genitals develop, a fine hair called lanugo covers the body, and baby is busy: kicking, sleeping, swallowing amniotic fluid, listening to you, passing urine, and sucking those thumbs.    OTHER INFORMATION:  If your provider orders labs or other studies, you probably won't hear from us unless something is abnormal.  How we define normal is different for many things in pregnancy.  This includes several of the numbers on a Complete Blood Count (CBC).  If your result is flagged as abnormal in [employer] but you don't hear from us, it's probably because things are actually normal based on where you are in your pregnancy.

## 2025-03-12 NOTE — PROGRESS NOTES
Iris Coronado  24 y.o.  MRN  [id number] PATIENT   2000   FINAL EDC:   8.10.25   by 9 wk US    Baby: ___ Boy    SO Name: Junior ALLERGIES:    NKDA      GBS: ___  Date: ___ OB PROBLEM LIST:    NORM (on Fe supplementation)   TO-DO THROUGHOUT PREGNANCY:  Depression Screen: ___  Circumcision: ___    Flu Shot: 25  TDAP: ___  Infant feeding: ___   Plans for epidural: ___  Classes at hospital: ___  PP contraception: ___  Delivery Consent: ___    BTL counseling: ___  Pediatrician: ___ MEDICATIONS:    PNV  Fe   TODAY'S PROGRESS NOTE    Patient is doing well today. She reports no complaints.     She denies vaginal bleeding.  She denies leakage of fluid.  She denies contractions or abdominal pain.  She denies pelvic pressure.   She denies headaches, vision changes, right upper quadrant pain, non-dependent edema.    Vitals:    25 1517   BP: 122/70   Pulse: 86   Weight: 66.6 kg (146 lb 13.2 oz)     FHT: 149    Assessment:   1. IUP at 18w3d    1. Encounter for supervision of normal first pregnancy in second trimester    2. 20 weeks gestation of pregnancy    3. Encounter for routine screening for malformation using ultrasound  -     US OB/GYN Procedure (Viewpoint) - Extended List; Future    4. Anemia affecting pregnancy in second trimester    5. Anemia affecting pregnancy in first trimester  -     ferrous sulfate (FEOSOL) 325 mg (65 mg iron) Tab tablet; Take 1 tablet (325 mg total) by mouth every 48 hours. (Every other day)  Dispense: 30 tablet; Refill: 11           Plan:    Return in 4 week  Anatomy US scheduled  Rx sent for iron  2T precautions given                   LABS   INITIAL LABS:    DATE: 1/3/25  MBT: B positive  AB SCREEN: negative  TP-ABS: negative  RUBELLA: Immune  Hep B sAg: negative  Hep C Ab: negative  HIV: negative  H/H: 10.6 / 31.4  PLT: 234  VARICELLA: Immune  HGB:  cannot rule out alpha thal  URINE CX: negative    PAP: PAP neg / Date: 23    BEV/CHLAM/TRICH: collected today /  Date: 1.8.25   OTHER LABS:    DATE: 1/16/25  cfDNA ([id number]): XY, neg for T13, T18, T21   CARRIER SCREEN (Inheritest Core): negative for CF, SMA, Fragile X      ULTRASOUNDS   ANATOMY SCAN:    Use LLWOANATOMYSCAN      HISTORY   MEDICAL HISTORY:    Pre-pregnancy BMI = 22   SURGICAL HISTORY:    none       OBSTETRIC HISTORY   Month/Year Mode of Delivery EGA Wt. M/F Epidural Complications / Comments   G1                                               SOCIAL HISTORY:    Smoker: non-smoker  Alcohol: yes but not since +HCG  Drugs: denies  Relationship: relationship with FOB  Domestic Violence: no  Lives with: Boyfriend  Education Level: Undergraduate Degree  Occupation:  Dental assistant  Worship: . GYN HISTORY:    PAP'S: no h/o abnormals  LAST PAP: PAP neg / Date: 5/30/2023  STD Hx: chlamydia  GENITAL HSV: no FAMILY HISTORY:    HTN: no (dad)  DIABETES: no (mom)  BLEEDING D/O: no  CLOTTING D/O: no  BIRTH DEFECTS: no  MENTAL DISABILITY: no  GYN CANCER: no

## 2025-03-23 ENCOUNTER — PATIENT MESSAGE (OUTPATIENT)
Dept: OTHER | Facility: OTHER | Age: 25
End: 2025-03-23
Payer: COMMERCIAL

## 2025-03-26 ENCOUNTER — HOSPITAL ENCOUNTER (OUTPATIENT)
Dept: OBSTETRICS AND GYNECOLOGY | Facility: CLINIC | Age: 25
Discharge: HOME | End: 2025-03-26
Attending: STUDENT IN AN ORGANIZED HEALTH CARE EDUCATION/TRAINING PROGRAM
Payer: OTHER GOVERNMENT

## 2025-03-26 DIAGNOSIS — Z36.3 ENCOUNTER FOR ROUTINE SCREENING FOR MALFORMATION USING ULTRASOUND: ICD-10-CM

## 2025-03-26 PROCEDURE — 76805 OB US >/= 14 WKS SNGL FETUS: CPT | Mod: 26,,, | Performed by: OBSTETRICS & GYNECOLOGY

## 2025-04-09 ENCOUNTER — ROUTINE PRENATAL (OUTPATIENT)
Dept: OBSTETRICS AND GYNECOLOGY | Facility: CLINIC | Age: 25
End: 2025-04-09
Payer: OTHER GOVERNMENT

## 2025-04-09 VITALS
SYSTOLIC BLOOD PRESSURE: 122 MMHG | WEIGHT: 157.19 LBS | DIASTOLIC BLOOD PRESSURE: 64 MMHG | HEART RATE: 90 BPM | BODY MASS INDEX: 27.84 KG/M2

## 2025-04-09 DIAGNOSIS — Z34.02 ENCOUNTER FOR SUPERVISION OF NORMAL FIRST PREGNANCY IN SECOND TRIMESTER: Primary | ICD-10-CM

## 2025-04-09 PROCEDURE — 99213 OFFICE O/P EST LOW 20 MIN: CPT | Mod: PBBFAC,PO | Performed by: STUDENT IN AN ORGANIZED HEALTH CARE EDUCATION/TRAINING PROGRAM

## 2025-04-09 PROCEDURE — 99999 PR PBB SHADOW E&M-EST. PATIENT-LVL III: CPT | Mod: PBBFAC,,, | Performed by: STUDENT IN AN ORGANIZED HEALTH CARE EDUCATION/TRAINING PROGRAM

## 2025-04-09 PROCEDURE — 0502F SUBSEQUENT PRENATAL CARE: CPT | Mod: ,,, | Performed by: STUDENT IN AN ORGANIZED HEALTH CARE EDUCATION/TRAINING PROGRAM

## 2025-04-09 NOTE — PROGRESS NOTES
Iris Coronado  24 y.o.  MRN  [id number] PATIENT   2000   FINAL EDC:   8.10.25   by 9 wk US    Baby: ___ Boy    SO Name: Junior ALLERGIES:    NKDA      GBS: ___  Date: ___ OB PROBLEM LIST:    NORM (on Fe supplementation)   TO-DO THROUGHOUT PREGNANCY:  Depression Screen: ___  Circumcision: ___    Flu Shot: 25  TDAP: ___  Infant feeding: ___   Plans for epidural: ___  Classes at hospital: ___  PP contraception: ___  Delivery Consent: ___    BTL counseling: ___  Pediatrician: ___ MEDICATIONS:    PNV  Fe   TODAY'S PROGRESS NOTE    Patient is doing well today. She reports no complaints. +FM    She denies vaginal bleeding.  She denies leakage of fluid.  She denies contractions or abdominal pain.  She denies pelvic pressure.   She denies headaches, vision changes, right upper quadrant pain, non-dependent edema.    Vitals:    25 1448   BP: 122/64   Pulse: 90   Weight: 71.3 kg (157 lb 3 oz)     FH: 22  FHT: 155        2025     3:00 PM   [place]  Depression Scale   I have been able to laugh and see the funny side of things. 0   I have looked forward with enjoyment to things. 0   I have blamed myself unnecessarily when things went wrong. 1   I have been anxious or worried for no good reason. 0   I have felt scared or panicky for no good reason. 0   Things have been getting on top of me. 0   I have been so unhappy that I have had difficulty sleeping. 0   I have felt sad or miserable. 0   I have been so unhappy that I have been crying. 0   The thought of harming myself has occurred to me. 0       Assessment:   1. IUP at 22w3d    1. Encounter for supervision of normal first pregnancy in second trimester           Plan:    Return in 3 week  Consents on RV  Schedule anatomy FU on RV  2T precautions given on RV                   LABS   INITIAL LABS:    DATE: 1/3/25  MBT: B positive  AB SCREEN: negative  TP-ABS: negative  RUBELLA: Immune  Hep B sAg: negative  Hep C Ab: negative  HIV:  negative  H/H: 10.6 / 31.4  PLT: 234  VARICELLA: Immune  HGB:  cannot rule out alpha thal  URINE CX: negative    PAP: PAP neg / Date: 5.24.23    BEV/CHLAM/TRICH: negative / Date: 1.8.25   OTHER LABS:    DATE: 1/16/25  cfDNA ([id number]): XY, neg for T13, T18, T21   CARRIER SCREEN (Inheritest Core): negative for CF, SMA, Fragile X      ULTRASOUNDS   ANATOMY SCAN:    DATE: 3/12/25 @ 20wks:  SEX: male  EFW: 359  ANATOMY: wnl but incomplete  PLACENTA: anterior  CERVIX: normal length  OTHER: repeat in 6 weeks        HISTORY   MEDICAL HISTORY:    Pre-pregnancy BMI = 22   SURGICAL HISTORY:    none       OBSTETRIC HISTORY   Month/Year Mode of Delivery EGA Wt. M/F Epidural Complications / Comments   G1                                               SOCIAL HISTORY:    Smoker: non-smoker  Alcohol: yes but not since +HCG  Drugs: denies  Relationship: relationship with FOB  Domestic Violence: no  Lives with: Boyfriend  Education Level: Undergraduate Degree  Occupation:  Dental assistant  Islam: . GYN HISTORY:    PAP'S: no h/o abnormals  LAST PAP: PAP neg / Date: 5/30/2023  STD Hx: chlamydia  GENITAL HSV: no FAMILY HISTORY:    HTN: no (dad)  DIABETES: no (mom)  BLEEDING D/O: no  CLOTTING D/O: no  BIRTH DEFECTS: no  MENTAL DISABILITY: no  GYN CANCER: no

## 2025-04-20 ENCOUNTER — PATIENT MESSAGE (OUTPATIENT)
Dept: OTHER | Facility: OTHER | Age: 25
End: 2025-04-20
Payer: COMMERCIAL

## 2025-05-01 ENCOUNTER — ROUTINE PRENATAL (OUTPATIENT)
Dept: OBSTETRICS AND GYNECOLOGY | Facility: CLINIC | Age: 25
End: 2025-05-01
Payer: COMMERCIAL

## 2025-05-01 VITALS
WEIGHT: 161.19 LBS | HEART RATE: 84 BPM | SYSTOLIC BLOOD PRESSURE: 130 MMHG | DIASTOLIC BLOOD PRESSURE: 72 MMHG | BODY MASS INDEX: 28.55 KG/M2

## 2025-05-01 DIAGNOSIS — O99.012 ANEMIA AFFECTING PREGNANCY IN SECOND TRIMESTER: ICD-10-CM

## 2025-05-01 DIAGNOSIS — Z34.02 ENCOUNTER FOR SUPERVISION OF NORMAL FIRST PREGNANCY IN SECOND TRIMESTER: Primary | ICD-10-CM

## 2025-05-01 DIAGNOSIS — Z36.2 ENCOUNTER FOR FOLLOW-UP ULTRASOUND OF FETAL ANATOMY: ICD-10-CM

## 2025-05-01 PROCEDURE — 99999 PR PBB SHADOW E&M-EST. PATIENT-LVL III: CPT | Mod: PBBFAC,,, | Performed by: STUDENT IN AN ORGANIZED HEALTH CARE EDUCATION/TRAINING PROGRAM

## 2025-05-01 NOTE — PROGRESS NOTES
Iris Coronado  24 y.o.  MRN  [id number] PATIENT   2000   FINAL EDC:   8.10.25   by 9 wk US    Baby: ___ Boy    SO Name: Junior ALLERGIES:    NKDA      GBS: ___  Date: ___ OB PROBLEM LIST:    NORM (on Fe supplementation)   TO-DO THROUGHOUT PREGNANCY:  Depression Screen: 4  Circumcision: ___    Flu Shot: 25  TDAP: ___  Infant feeding: ___   Plans for epidural: ___  Classes at hospital: ___  PP contraception: ___  Delivery Consent: 25    Pediatrician: ___ MEDICATIONS:    PNV  Fe   TODAY'S PROGRESS NOTE    Patient is doing well today. She reports no complaints. +FM    She denies vaginal bleeding.  She denies leakage of fluid.  She denies contractions or abdominal pain.  She denies pelvic pressure.   She denies headaches, vision changes, right upper quadrant pain, non-dependent edema.    Vitals:    25 1521   BP: 130/72   Pulse: 84   Weight: 73.1 kg (161 lb 2.5 oz)     FH: 25  FHT: 145    Assessment:   1. IUP at 25w4d    1. Encounter for supervision of normal first pregnancy in second trimester  -     Ferritin; Future; Expected date: 2025  -     CBC Auto Differential; Future; Expected date: 2025  -     Treponema Pallidium Antibodies IgG, IgM; Future; Expected date: 2025  -     OB Glucose Screen; Future; Expected date: 2025    2. Anemia affecting pregnancy in second trimester    3. Encounter for follow-up ultrasound of fetal anatomy  -     US OB/GYN Procedure (Viewpoint) - Extended List; Future; Expected date: 2025           Plan:    Return in 3 week  2T precautions  3T labs and 1h GTT scheduled  FU anatomy US scheduled  Today we obtained informed consent for delivery and blood transfusion.  Patient watched a video that explained in detail what to expect in labor and delivery.  We addressed the fact that our role is to safely guide her and her baby through the birthing process although we are not in control of the process, and neither is she.  We will  respond to what her baby and her body give us, as determined by FHR monitoring, tocometry, labor exams, and other clinical indicators.  She understands many emergencies can occur during labor and delivery, and our interventions aren't always adequate or timely enough to prevent complications.  Two emergencies that we discussed in particular are shoulder dystocia and hemorrhage, and these were explained in detail.  She agrees to the use or administration of the following if indicated:  IV fluids, antibiotics, cervical ripening (mechanical and medicinal methods), oxytocin (Pitocin), amniotomy, IV or IM pain medications, epidural or spinal anesthesia, general anesthesia, internal monitors (FSE and IUPC), vacuum or forceps delivery, , episiotomy, medications and mechanical devices for treating hemorrhage, surgical interventions, blood transfusion, and other medicines and interventions considered important for her or her baby's health.  The patient understands that labor, delivery, and the above listed interventions can pose many risks to her and to her baby, which include but are not limited to, viral/bacterial infection, allergic reaction, temporary or permanent bodily injury or disability, brain damage, nerve damage, DVT/PE, and death.  Counseled on risk of blood transfusion including but not limited to fluid overload, fever, allergic reaction, and blood-borne infections.  She had adequate opportunity to ask questions, and they were answered to her apparent satisfaction.                   LABS   INITIAL LABS:    DATE: 1/3/25  MBT: B positive  AB SCREEN: negative  TP-ABS: negative  RUBELLA: Immune  Hep B sAg: negative  Hep C Ab: negative  HIV: negative  H/H: 10.6 / 31.4  PLT: 234  VARICELLA: Immune  HGB:  cannot rule out alpha thal  URINE CX: negative    PAP: PAP neg / Date: 23    BEV/CHLAM/TRICH: negative / Date: 25   OTHER LABS:    DATE: 25  cfDNA ([id number]): XY, neg for T13, T18, T21    CARRIER SCREEN (Inheritest Core): negative for CF, SMA, Fragile X      ULTRASOUNDS   ANATOMY SCAN:    DATE: 3/26/25 @ 20wks:  SEX: male  EFW: 359  ANATOMY: wnl but incomplete  PLACENTA: anterior  CERVIX: normal length  OTHER: repeat in 6 weeks        HISTORY   MEDICAL HISTORY:    Pre-pregnancy BMI = 22   SURGICAL HISTORY:    none       OBSTETRIC HISTORY   Month/Year Mode of Delivery EGA Wt. M/F Epidural Complications / Comments   G1                                               SOCIAL HISTORY:    Smoker: non-smoker  Alcohol: yes but not since +HCG  Drugs: denies  Relationship: relationship with FOB  Domestic Violence: no  Lives with: Boyfriend  Education Level: Undergraduate Degree  Occupation:  Dental assistant  Adventism: . GYN HISTORY:    PAP'S: no h/o abnormals  LAST PAP: PAP neg / Date: 5/30/2023  STD Hx: chlamydia  GENITAL HSV: no FAMILY HISTORY:    HTN: no (dad)  DIABETES: no (mom)  BLEEDING D/O: no  CLOTTING D/O: no  BIRTH DEFECTS: no  MENTAL DISABILITY: no  GYN CANCER: no

## 2025-05-04 ENCOUNTER — PATIENT MESSAGE (OUTPATIENT)
Dept: OTHER | Facility: OTHER | Age: 25
End: 2025-05-04
Payer: COMMERCIAL

## 2025-05-09 ENCOUNTER — HOSPITAL ENCOUNTER (OUTPATIENT)
Dept: OBSTETRICS AND GYNECOLOGY | Facility: CLINIC | Age: 25
Discharge: HOME | End: 2025-05-09
Attending: STUDENT IN AN ORGANIZED HEALTH CARE EDUCATION/TRAINING PROGRAM
Payer: COMMERCIAL

## 2025-05-09 DIAGNOSIS — Z36.2 ENCOUNTER FOR FOLLOW-UP ULTRASOUND OF FETAL ANATOMY: ICD-10-CM

## 2025-05-09 PROCEDURE — 76816 OB US FOLLOW-UP PER FETUS: CPT | Mod: 26,,, | Performed by: OBSTETRICS & GYNECOLOGY

## 2025-05-18 ENCOUNTER — PATIENT MESSAGE (OUTPATIENT)
Dept: OTHER | Facility: OTHER | Age: 25
End: 2025-05-18
Payer: COMMERCIAL

## 2025-05-19 ENCOUNTER — LAB VISIT (OUTPATIENT)
Dept: LAB | Facility: HOSPITAL | Age: 25
End: 2025-05-19
Attending: STUDENT IN AN ORGANIZED HEALTH CARE EDUCATION/TRAINING PROGRAM
Payer: COMMERCIAL

## 2025-05-19 DIAGNOSIS — Z34.02 ENCOUNTER FOR SUPERVISION OF NORMAL FIRST PREGNANCY IN SECOND TRIMESTER: ICD-10-CM

## 2025-05-19 LAB
ABSOLUTE EOSINOPHIL (OHS): 0.34 K/UL
ABSOLUTE MONOCYTE (OHS): 0.54 K/UL (ref 0.3–1)
ABSOLUTE NEUTROPHIL COUNT (OHS): 6.34 K/UL (ref 1.8–7.7)
BASOPHILS # BLD AUTO: 0.03 K/UL
BASOPHILS NFR BLD AUTO: 0.3 %
ERYTHROCYTE [DISTWIDTH] IN BLOOD BY AUTOMATED COUNT: 12.6 % (ref 11.5–14.5)
FERRITIN SERPL-MCNC: 25 NG/ML (ref 20–300)
GLUCOSE SERPL-MCNC: 129 MG/DL (ref 70–140)
HCT VFR BLD AUTO: 31.8 % (ref 37–48.5)
HGB BLD-MCNC: 10.5 GM/DL (ref 12–16)
IMM GRANULOCYTES # BLD AUTO: 0.08 K/UL (ref 0–0.04)
IMM GRANULOCYTES NFR BLD AUTO: 0.9 % (ref 0–0.5)
LYMPHOCYTES # BLD AUTO: 1.33 K/UL (ref 1–4.8)
MCH RBC QN AUTO: 30.8 PG (ref 27–31)
MCHC RBC AUTO-ENTMCNC: 33 G/DL (ref 32–36)
MCV RBC AUTO: 93 FL (ref 82–98)
NUCLEATED RBC (/100WBC) (OHS): 0 /100 WBC
PLATELET # BLD AUTO: 201 K/UL (ref 150–450)
PMV BLD AUTO: 11.1 FL (ref 9.2–12.9)
RBC # BLD AUTO: 3.41 M/UL (ref 4–5.4)
RELATIVE EOSINOPHIL (OHS): 3.9 %
RELATIVE LYMPHOCYTE (OHS): 15.4 % (ref 18–48)
RELATIVE MONOCYTE (OHS): 6.2 % (ref 4–15)
RELATIVE NEUTROPHIL (OHS): 73.3 % (ref 38–73)
T PALLIDUM IGG+IGM SER QL: NORMAL
WBC # BLD AUTO: 8.66 K/UL (ref 3.9–12.7)

## 2025-05-19 PROCEDURE — 86593 SYPHILIS TEST NON-TREP QUANT: CPT

## 2025-05-19 PROCEDURE — 85025 COMPLETE CBC W/AUTO DIFF WBC: CPT

## 2025-05-19 PROCEDURE — 36415 COLL VENOUS BLD VENIPUNCTURE: CPT | Mod: PO

## 2025-05-19 PROCEDURE — 82728 ASSAY OF FERRITIN: CPT

## 2025-05-19 PROCEDURE — 82950 GLUCOSE TEST: CPT

## 2025-05-20 ENCOUNTER — RESULTS FOLLOW-UP (OUTPATIENT)
Dept: OBSTETRICS AND GYNECOLOGY | Facility: CLINIC | Age: 25
End: 2025-05-20

## 2025-05-22 ENCOUNTER — ROUTINE PRENATAL (OUTPATIENT)
Dept: OBSTETRICS AND GYNECOLOGY | Facility: CLINIC | Age: 25
End: 2025-05-22
Payer: COMMERCIAL

## 2025-05-22 VITALS
BODY MASS INDEX: 29.33 KG/M2 | DIASTOLIC BLOOD PRESSURE: 72 MMHG | HEART RATE: 89 BPM | WEIGHT: 165.56 LBS | SYSTOLIC BLOOD PRESSURE: 120 MMHG

## 2025-05-22 DIAGNOSIS — O26.843 UTERINE SIZE DATE DISCREPANCY PREGNANCY, THIRD TRIMESTER: ICD-10-CM

## 2025-05-22 DIAGNOSIS — Z23 NEED FOR TDAP VACCINATION: ICD-10-CM

## 2025-05-22 DIAGNOSIS — Z34.03 ENCOUNTER FOR SUPERVISION OF NORMAL FIRST PREGNANCY IN THIRD TRIMESTER: Primary | ICD-10-CM

## 2025-05-22 DIAGNOSIS — O99.011 ANEMIA AFFECTING PREGNANCY IN FIRST TRIMESTER: ICD-10-CM

## 2025-05-22 PROCEDURE — 90471 IMMUNIZATION ADMIN: CPT | Mod: S$GLB,,,

## 2025-05-22 PROCEDURE — 99999 PR PBB SHADOW E&M-EST. PATIENT-LVL III: CPT | Mod: PBBFAC,,, | Performed by: STUDENT IN AN ORGANIZED HEALTH CARE EDUCATION/TRAINING PROGRAM

## 2025-05-22 PROCEDURE — 90715 TDAP VACCINE 7 YRS/> IM: CPT | Mod: S$GLB,,,

## 2025-05-22 RX ORDER — FERROUS SULFATE 325(65) MG
325 TABLET ORAL
Qty: 90 TABLET | Refills: 2 | Status: SHIPPED | OUTPATIENT
Start: 2025-05-22

## 2025-05-23 PROBLEM — Z34.03 ENCOUNTER FOR SUPERVISION OF NORMAL FIRST PREGNANCY IN THIRD TRIMESTER: Status: ACTIVE | Noted: 2025-01-08

## 2025-05-23 NOTE — PROGRESS NOTES
Iris Coronado  24 y.o.  MRN  [id number] PATIENT   2000   FINAL EDC:   8.10.25   by 9 wk US    Baby: ___ Boy    SO Name: Junior ALLERGIES:    NKDA      GBS: ___  Date: ___ OB PROBLEM LIST:    NORM (on Fe supplementation)   TO-DO THROUGHOUT PREGNANCY:  Depression Screen: 25  Circumcision: ___    Flu Shot: 25  TDAP: ___  Infant feeding: ___   Plans for epidural: ___  Classes at hospital: ___  PP contraception: ___  Delivery Consent: 25    Pediatrician: ___ MEDICATIONS:    PNV  Fe   TODAY'S PROGRESS NOTE    Patient is doing well today. She reports no complaints. +FM    She denies vaginal bleeding.  She denies leakage of fluid.  She denies contractions or abdominal pain.  She denies pelvic pressure.   She denies headaches, vision changes, right upper quadrant pain, non-dependent edema.    Vitals:    25 1537   BP: 120/72   Pulse: 89   Weight: 75.1 kg (165 lb 9.1 oz)     FH: 26  FHT: 135    Assessment:   1. IUP at 28w3d    1. Encounter for supervision of normal first pregnancy in third trimester    2. Need for Tdap vaccination  -     Tdap (BOOSTRIX) vaccine injection 0.5 mL    3. Uterine size date discrepancy pregnancy, third trimester  -     US OB/GYN Procedure ([facility]) - Extended List; Future    4. Anemia affecting pregnancy in first trimester  -     ferrous sulfate (FEOSOL) 325 mg (65 mg iron) Tab tablet; Take 1 tablet (325 mg total) by mouth every 48 hours. (Every other day)  Dispense: 90 tablet; Refill: 2           Plan:    Keep appt as scheduled  Reviewed 3T labs and GTT  3T precautions  Growth US schedule as size < dates  Tdap given                 LABS   INITIAL LABS:    DATE: 1/3/25  MBT: B positive  AB SCREEN: negative  TP-ABS: negative  RUBELLA: Immune  Hep B sAg: negative  Hep C Ab: negative  HIV: negative  H/H: 10.6 / 31.4  PLT: 234  VARICELLA: Immune  HGB: cannot rule out alpha thal  URINE CX: negative    PAP: PAP neg / Date: 23    BEV/CHLAM/TRICH: negative / Date:  1.8.25   OTHER LABS:    DATE: 1/16/25  cfDNA ([id number]): XY, neg for T13, T18, T21   CARRIER SCREEN (Inheritest Core): negative for CF, SMA, Fragile X        Date 5.19.25  H/H: 10.5 / 31.8  PLT: 201  FERRITIN: 25  FTA-ABS: negative    1HR GDM SCREEN:   Lab Results   Component Value Date    OBGLUCOSESCR 129 05/19/2025          ULTRASOUNDS   ANATOMY SCAN:    DATE: 3/26/25 @ 20wks:  SEX: male  EFW: 359  ANATOMY: wnl but incomplete  PLACENTA: anterior  CERVIX: normal length  OTHER: repeat in 6 weeks       DATE: 5.13.25  [place] IUP with cardiac activity is identified.   The fetal anatomic survey was completed today and no fetal structural abnormalities were noted.   Interval fetal growth has been normal (EFW 47%, AC 46%).   MVP is normal.    HISTORY   MEDICAL HISTORY:    Pre-pregnancy BMI = 22   SURGICAL HISTORY:    none       OBSTETRIC HISTORY   Month/Year Mode of Delivery EGA Wt. M/F Epidural Complications / Comments   G1                                               SOCIAL HISTORY:    Smoker: non-smoker  Alcohol: yes but not since +HCG  Drugs: denies  Relationship: relationship with FOB  Domestic Violence: no  Lives with: Boyfriend  Education Level: Undergraduate Degree  Occupation: Dental assistant  [employer]ian: . GYN HISTORY:    PAP'S: no h/o abnormals  LAST PAP: PAP neg / Date: 5/30/2023  STD Hx: chlamydia  GENITAL HSV: no FAMILY HISTORY:    HTN: no (dad)  DIABETES: no (mom)  BLEEDING D/O: no  CLOTTING D/O: no  BIRTH DEFECTS: no  MENTAL DISABILITY: no  GYN CANCER: no

## 2025-06-01 ENCOUNTER — PATIENT MESSAGE (OUTPATIENT)
Dept: OTHER | Facility: OTHER | Age: 25
End: 2025-06-01
Payer: COMMERCIAL

## 2025-06-15 ENCOUNTER — PATIENT MESSAGE (OUTPATIENT)
Dept: OTHER | Facility: OTHER | Age: 25
End: 2025-06-15
Payer: COMMERCIAL

## 2025-06-27 NOTE — PROGRESS NOTES
"   Done: Declined or N/A: Date /  Notes:   Depression Screen (20wks) [x]  2025   Offered classes at hospital (20wks) [x]   2025    Consent for delivery (24wks) [x]   2025   Circumcision Consent [x]  []  2025   TDAP (28wks) [x]  []  2025   Rhogam (28wks) []  [x]  B pos   Flu shot (OCT-MAY) [x]  []  2025   RSV shot (32+0 - 36+6wks and SEP-) []  []     GBS collected (36wks) []      Pre-registered []      Birth Certificate Info Given []      Pediatrician selected []        Iris Coronado  24 y.o.  MRN  [id number] PATIENT   2000   FINAL EDC:   8.10.25   by 9 wk US    Baby: Boy "Collona"    SO Name: Junior ALLERGIES:    NKDA      GBS: ___  Date: ___ OB PROBLEM LIST:    NORM (on Fe supplementation)   TO-DO THROUGHOUT PREGNANCY:  Depression Screen: 4.9.  Circumcision: consent 2025     Flu Shot: 1..25  TDAP:   Infant feeding: breast   Plans for epidural: yes  Classes at hospital: offered  PP contraception: ___  Delivery Consent: 25    Pediatrician: ___ MEDICATIONS:    PNV  Fe   TODAY'S PROGRESS NOTE  2025    24 y.o.  at 34w1d  Appointment type: routine    SUBJECTIVE   No: Yes:  Notes:   Vaginal bleeding: [x] []     Concern for ROM / leaking fluids: [x]  []     Pain, cramps, contractions: [x]  []     Normal fetal movement: []  [x]  [] n/a   Other questions or concerns: []  [x]  Would like to sign consents for circ.     Informed Consent for  Circumcision:  Discussed that circumcision is the surgical removal of the foreskin of the penis.  This is an elective procedure, meaning it is not medically indicated or required.  The procedure has been around for centuries, and people choose to have it done for Yarsanism, cultural, and/or medical reasons.  Discussed potential Benefits: lower rates of urinary tract infections, penile cancer, and STD's (HIV, HPV, and possibly others).  Discussed potential Disadvantages: complications from the procedure " (inadequate or too much skin removal, bleeding, infection, urethral complications, injury to the penis, scarring), sexual dissatisfaction, pain.  Discussed:  He must receive vitamin K prior to the procedure.  The procedure must be approved by the pediatrician responsible for him in the hospital.  You will have to sign a lengthy hospital consent form, acknowledging the risks and benefits of the procedure.  If you have [place] Medicaid, you will have to pay out-of-pocket for the procedure (estimated $400).    OBJECTIVE  Vitals:    25 1334   BP: 100/60   Pulse: 90     Had normal growth US today (results below)    ASSESSMENT AND PLAN   at 34w1d, doing well.    Consent for circ today  28wk labs reviewed with the patient: continue po iron  PTL precautions reviewed; FMC reviewed  RTC ~36wks EGA for LILLI, sooner prn    Lidia Shaver MD      LABS   INITIAL LABS:    DATE: 1/3/25  MBT: B positive  AB SCREEN: negative  TP-ABS: negative  RUBELLA: Immune  Hep B sAg: negative  Hep C Ab: negative  HIV: negative  H/H: 10.6 / 31.4  PLT: 234  VARICELLA: Immune  HGB: cannot rule out alpha thal  URINE CX: negative    PAP: PAP neg / Date: 23    BEV/CHLAM/TRICH: negative / Date: 25   OTHER LABS:    DATE: 25  cfDNA ([id number]): XY, neg for T13, T18, T21   CARRIER SCREEN (Inheritest Core): negative for CF, SMA, Fragile X      WBC 8.66 2025   HGB 10.5 (L) 2025   HCT 31.8 (L) 2025    2025   MCV 93 2025   FERRITIN 25.0 2025   OBGLUCOSESCR 129 2025   TREPABIGMIGG Non-Reactive 2025          ULTRASOUNDS   ANATOMY SCAN:    DATE: 3/26/25 @ 20wks:  SEX: male  EFW: 359  ANATOMY: wnl but incomplete  PLACENTA: anterior  CERVIX: normal length    DATE: 25  The fetal anatomic survey was completed today and no fetal structural abnormalities were noted.   Interval fetal growth has been normal (EFW 47%, AC 46%).   MVP is normal.    GROWTH US (2025 @ 34w1d  ):  EFW 2564g = 41 percentile, cephalic presentation, (5lb 10oz)   HISTORY   MEDICAL HISTORY:    Pre-pregnancy BMI = 22   SURGICAL HISTORY:    none       OBSTETRIC HISTORY   Month/Year Mode of Delivery EGA Wt. M/F Epidural Complications / Comments   G1                                               SOCIAL HISTORY:    Smoker: non-smoker  Alcohol: yes but not since +HCG  Drugs: denies  Relationship: relationship with FOB  Domestic Violence: no  Lives with: Boyfriend  Education Level: Undergraduate Degree  Occupation: Dental assistant  Latter-day: none GYN HISTORY:    PAP'S: no h/o abnormals  LAST PAP: PAP neg / Date: 5/30/2023  STD Hx: chlamydia  GENITAL HSV: no FAMILY HISTORY:    HTN: no (dad)  DIABETES: no (mom)  BLEEDING D/O: no  CLOTTING D/O: no  BIRTH DEFECTS: no  MENTAL DISABILITY: no  GYN CANCER: no

## 2025-06-30 ENCOUNTER — HOSPITAL ENCOUNTER (OUTPATIENT)
Dept: OBSTETRICS AND GYNECOLOGY | Facility: CLINIC | Age: 25
Discharge: HOME | End: 2025-06-30
Attending: STUDENT IN AN ORGANIZED HEALTH CARE EDUCATION/TRAINING PROGRAM
Payer: COMMERCIAL

## 2025-06-30 ENCOUNTER — ROUTINE PRENATAL (OUTPATIENT)
Dept: OBSTETRICS AND GYNECOLOGY | Facility: CLINIC | Age: 25
End: 2025-06-30
Payer: COMMERCIAL

## 2025-06-30 VITALS
BODY MASS INDEX: 31.22 KG/M2 | HEART RATE: 90 BPM | WEIGHT: 176.25 LBS | DIASTOLIC BLOOD PRESSURE: 60 MMHG | SYSTOLIC BLOOD PRESSURE: 100 MMHG

## 2025-06-30 DIAGNOSIS — Z34.90 ENCOUNTER FOR SUPERVISION OF NORMAL PREGNANCY, ANTEPARTUM, UNSPECIFIED GRAVIDITY: Primary | ICD-10-CM

## 2025-06-30 DIAGNOSIS — O26.843 UTERINE SIZE DATE DISCREPANCY PREGNANCY, THIRD TRIMESTER: ICD-10-CM

## 2025-06-30 PROCEDURE — 99999 PR PBB SHADOW E&M-EST. PATIENT-LVL III: CPT | Mod: PBBFAC,,, | Performed by: GENERAL PRACTICE

## 2025-06-30 PROCEDURE — 0502F SUBSEQUENT PRENATAL CARE: CPT | Mod: S$GLB,,, | Performed by: GENERAL PRACTICE

## 2025-06-30 PROCEDURE — 76816 OB US FOLLOW-UP PER FETUS: CPT | Mod: 26,,, | Performed by: OBSTETRICS & GYNECOLOGY

## 2025-06-30 NOTE — PATIENT INSTRUCTIONS
To schedule classes (see below for what's offered) at the hospital, call [phone].    Have a question or concern?    PRO TIP: Program these phone numbers in your cell phone!    for an emergency  call 911 or go to the nearest hospital Especially after 20 weeks of the pregnancy, please remember that  Labor & Delivery is at [facility].  There is no L&D at [facility]  After hours you can only access L&D through the Emergency Room (entrance on [facility]).   [facility] Nurse Care Advice Line  [phone] At any time during your pregnancy,  you can speak to a nurse 24-7.   for non-urgent issues, send us a  message in Zoyi Consider calling the Nurse Care Advice Line if it's a weekend or  toward the end of the work-day since  [place]i and phone messages may not be answered for a day or two.   for non-urgent issues, call the clinic  [phone], Option 3    Labor & Delivery  [phone] Starting at 20 weeks of the pregnancy,  you can speak to a nurse on L&D 24-7.     THIRD TRIMESTER  29+0 - 42 weeks    Adapting to Pregnancy: Third Trimester   Some physical discomforts may seem worse in the final weeks of pregnancy. Simple lifestyle changes can help as you keep good posture and use good body mechanics.  Pay attention to your changing center of gravity.  Be kind to yourself and know your limits - your body is hosting an entire other human!  Ask for help if you need it.  Take fiber supplements, drink lots of water, and avoid prolonged sitting or standing to prevent constipation and hemorrhoids.  Be sure you're getting enough rest: avoid caffeine after 3pm, use a warm bath to relax before bedtime, ask for back/neck/shoulder massages from your partner, try drinking warm decaf tea or milk at bedtime, get heartburn under control.  Speaking of heartburnavoid spicy / acidic / sugary foods, especially in the evenings.  Eat slowly and in small amounts, and avoiding eating during the 2  hours before bedtime.  Try sleeping with your upper body elevated.    Your To-Do List  If you haven't already, get these important things done!  A few new tasks include having the carseat ready, having hospital bags packed, and making arrangements if needed for other children and/or pets while you're in the hospital.    We'll ask you to pre-register at the hospital around 36 weeks so you have a little less paperwork to do when the big day arrives.  You can walk-in at any time.  Go in the hospital's main entrance on [address] (near the pharmacy).  Walk in to Registration, which is across from the Information Desk.  You'll need your ID and insurance cards.    More information on these topics can be found in your OB Welcome Packet and the A-Z Book:  Choose a pediatrician for your baby.  Sign up for a tour and classes at the hospital.  All classes are free of charge if you are delivering at [facility].  Call [phone] to register for any of these classes:  Baby Love (learn about the delivery process and caring for a )  Big Brother / Big Sister Class (to help siblings prepare for baby)  Lamaze (a 4 week class to learn about natural interventions for labor)  Breastfeeding (get a head start learning about breastfeeding)  Work on some methods for coping with the pains of labor.  A good bit of labor happens before you can get an epidural, and you'll feel more confident if you have a plan that you've practiced.  We encourage everyone to breastfeed if they can (and most women can!).  Please ask us questions if you have them.  Decide what you'd like to use for contraception (birth control) after this baby is born.  If you're having a boy, let us know if you plan to have him circumcised.    Things to Look Out For  The Four Questions (please read more about these in your OB Welcome Packet): 1) Baby's Movements, 2) Contractions / Cramping, 3) Vaginal bleeding, 4) Leaking fluids  Mood swings and depression - some mood swings are  expected in pregnancy, but please ask for help if you are feeling overwhelmed or sad all the time.  Headaches that don't improve with rest, drinking water, and tylenol.  Severe swelling, especially of the face and hands. Remember some swelling of the lower legs is normal, especially if you have been on your feet for some time.    Intimacy   Unless your doctor tells you not to, it's still fine to continue having sex. The third trimester though can be a challenge comfort-wise. Try different positions and see what's best for you.    Baby!  Baby kicks and stretches despite running low on room, lanugo disappears, baby gains about a half of a pound per week, and bones harden (except for the skull, which remains soft and flexible for delivery).    OTHER INFORMATION:  If your provider orders labs or other studies, you probably won't hear from us unless something is abnormal.  How we define normal is different for many things in pregnancy.  This includes several of the numbers on a Complete Blood Count (CBC).  If your result is flagged as abnormal in MyChart but you don't hear from us, it's probably because things are actually normal based on where you are in your pregnancy.

## 2025-07-06 ENCOUNTER — PATIENT MESSAGE (OUTPATIENT)
Dept: OTHER | Facility: OTHER | Age: 25
End: 2025-07-06
Payer: COMMERCIAL

## 2025-07-14 ENCOUNTER — ROUTINE PRENATAL (OUTPATIENT)
Dept: OBSTETRICS AND GYNECOLOGY | Facility: CLINIC | Age: 25
End: 2025-07-14
Payer: COMMERCIAL

## 2025-07-14 VITALS
WEIGHT: 179 LBS | DIASTOLIC BLOOD PRESSURE: 70 MMHG | HEART RATE: 99 BPM | BODY MASS INDEX: 31.71 KG/M2 | SYSTOLIC BLOOD PRESSURE: 118 MMHG

## 2025-07-14 DIAGNOSIS — Z34.90 ENCOUNTER FOR SUPERVISION OF NORMAL PREGNANCY, ANTEPARTUM, UNSPECIFIED GRAVIDITY: ICD-10-CM

## 2025-07-14 DIAGNOSIS — Z3A.36 36 WEEKS GESTATION OF PREGNANCY: Primary | ICD-10-CM

## 2025-07-14 LAB — PRENATAL STREP B CULTURE: POSITIVE

## 2025-07-14 PROCEDURE — 99999 PR PBB SHADOW E&M-EST. PATIENT-LVL III: CPT | Mod: PBBFAC,,, | Performed by: FAMILY MEDICINE

## 2025-07-14 PROCEDURE — 87653 STREP B DNA AMP PROBE: CPT | Performed by: FAMILY MEDICINE

## 2025-07-14 NOTE — PROGRESS NOTES
"   Done: Declined or N/A: Date /  Notes:   Depression Screen (20wks) [x]  2025   Offered classes at hospital (20wks) [x]   2025    Consent for delivery (24wks) [x]   2025   Circumcision Consent [x]  []  2025   TDAP (28wks) [x]  []  2025   Rhogam (28wks) []  [x]  B pos   Flu shot (OCT-MAY) [x]  []  2025   RSV shot (32+0 - 36+6wks and SEP-) []  []     GBS collected (36wks) [x]   2025   Pre-registered []      Birth Certificate Info Given [x]   2025   Pediatrician selected [x]   Dr Mel Coronado  24 y.o.  MRN  [id number] PATIENT   2000   FINAL EDC:   8.10.25   by 9 wk US    Baby: Boy "Colsen"    SO Name: Junior ALLERGIES:    NKDA      GBS: ___  Date: 2025 OB PROBLEM LIST:    NORM (on Fe supplementation)   TO-DO THROUGHOUT PREGNANCY:  Depression Screen: 4.9.25  Circumcision: consent 2025     Flu Shot: 1..25  TDAP:   Infant feeding: breast   Plans for epidural: yes  Classes at hospital: offered  PP contraception: ___  Delivery Consent: 25    Pediatrician: ___ MEDICATIONS:    PNV  Fe   TODAY'S PROGRESS NOTE  2025    24 y.o.  at 36w1d  Appointment type: routine    Current Outpatient Medications   Medication Instructions    ferrous sulfate (FEOSOL) 325 mg, Oral, Every 48 hours, (Every other day)    prenatal no115/iron/folic acid (PRENATAL 19 ORAL) Take by mouth.       SUBJECTIVE       No: Yes:  Notes:   Vaginal bleeding: [x] []     Concern for ROM / leaking fluids: [x]  []     Pain, cramps, contractions: [x]  []     Normal fetal movement: []  [x]  [] n/a   Other questions or concerns: []  []  Swelling to ankles    She reports she normally is on her feet at work and wears compression socks. She reports usually it resolves with sleep but yesterday woke up still swelling.     OBJECTIVE  There were no vitals filed for this visit.    FH: 36 cm  DT'S: 145-150 bpm by doppler  CEPHALIC by palpation.    +1 pedal edema " noted    GBS culture obtained from vagina/perineum.    ASSESSMENT AND PLAN   at 36w1d, doing well.    f/u results of GBS PCR swab  Patient directed to Registration at [facility] to complete preadmission paperwork.  Encouraged patient to have her bags packed and car seat ready.  Also encouraged her to make other arrangements (childcare, pet care, etc.) if needed.  PTL precautions reviewed; FMC reviewed  RTC ~38wks EGA for LILLI, sooner prn    Jessica Ordoñez NP  I discussed with Dr Rush and since BP in normal range will continue to monitor this. Return to clinic in 2 weeks.     LABS   INITIAL LABS:    DATE: 1/3/25  MBT: B positive  AB SCREEN: negative  TP-ABS: negative  RUBELLA: Immune  Hep B sAg: negative  Hep C Ab: negative  HIV: negative  H/H: 10.6 / 31.4  PLT: 234  VARICELLA: Immune  HGB: cannot rule out alpha thal  URINE CX: negative    PAP: PAP neg / Date: 23    BEV/CHLAM/TRICH: negative / Date: 25   OTHER LABS:    DATE: 25  cfDNA ([id number]): XY, neg for T13, T18, T21   CARRIER SCREEN (Inheritest Core): negative for CF, SMA, Fragile X      WBC 8.66 2025   HGB 10.5 (L) 2025   HCT 31.8 (L) 2025    2025   MCV 93 2025   FERRITIN 25.0 2025   OBGLUCOSESCR 129 2025   TREPABIGMIGG Non-Reactive 2025          ULTRASOUNDS   ANATOMY SCAN:    DATE: 3/26/25 @ 20wks:  SEX: male  EFW: 359  ANATOMY: wnl but incomplete  PLACENTA: anterior  CERVIX: normal length    DATE: 25  The fetal anatomic survey was completed today and no fetal structural abnormalities were noted.   Interval fetal growth has been normal (EFW 47%, AC 46%).   MVP is normal.    GROWTH US (2025 @ 34w1d ):  EFW 2564g = 41 percentile, cephalic presentation, (5lb 10oz)   HISTORY   MEDICAL HISTORY:    Pre-pregnancy BMI = 22   SURGICAL HISTORY:    none       OBSTETRIC HISTORY   Month/Year Mode of Delivery EGA Wt. M/F Epidural Complications / Comments   G1                                                SOCIAL HISTORY:    Smoker: non-smoker  Alcohol: yes but not since +HCG  Drugs: denies  Relationship: relationship with FOB  Domestic Violence: no  Lives with: Boyfriend  Education Level: Undergraduate Degree  Occupation: Dental assistant  Buddhism: none GYN HISTORY:    PAP'S: no h/o abnormals  LAST PAP: PAP neg / Date: 5/30/2023  STD Hx: chlamydia  GENITAL HSV: no FAMILY HISTORY:    HTN: no (dad)  DIABETES: no (mom)  BLEEDING D/O: no  CLOTTING D/O: no  BIRTH DEFECTS: no  MENTAL DISABILITY: no  GYN CANCER: no

## 2025-07-16 LAB — GROUP B STREPTOCOCCUS, PCR (OHS): POSITIVE

## 2025-07-17 ENCOUNTER — NURSE TRIAGE (OUTPATIENT)
Dept: ADMINISTRATIVE | Facility: CLINIC | Age: 25
End: 2025-07-17
Payer: COMMERCIAL

## 2025-07-17 NOTE — TELEPHONE ENCOUNTER
Pt called and stated she is 36 weeks pregnant and lost her mucous plug last night. Care advice recommends go to LD now (or to office with PCP approval). Attempted to reach [facility] office with no success. Pt advised to go to L&D to be seen. Pt verbalized understanding.  Reason for Disposition   Pregnant 37 or more weeks (i.e., term) and pinkish or brownish mucous discharge   Pregnant 24 - 36 weeks () and pinkish or brownish mucous discharge  (Exception: Single episode of faint spotting when wiping, or slight spotting after intercourse or pelvic exam.)    Additional Information   Negative: Pregnant 23 or more weeks and baby is moving less today (e.g., kick count < 5 in 1 hour or < 10 in 2 hours)    Protocols used: Pregnancy - Vaginal [id number]

## 2025-07-20 ENCOUNTER — PATIENT MESSAGE (OUTPATIENT)
Dept: OBSTETRICS AND GYNECOLOGY | Facility: CLINIC | Age: 25
End: 2025-07-20
Payer: COMMERCIAL

## 2025-07-21 NOTE — TELEPHONE ENCOUNTER
Pt is 37w1d. Please advise in regard to heartburn unrelieved with OTC meds mentioned in the pregnancy book.

## 2025-07-22 ENCOUNTER — TELEPHONE (OUTPATIENT)
Dept: OBSTETRICS AND GYNECOLOGY | Facility: CLINIC | Age: 25
End: 2025-07-22
Payer: COMMERCIAL

## 2025-07-22 DIAGNOSIS — K21.9 GASTROESOPHAGEAL REFLUX DISEASE, UNSPECIFIED WHETHER ESOPHAGITIS PRESENT: Primary | ICD-10-CM

## 2025-07-25 NOTE — PROGRESS NOTES
Done: Declined or N/A: Date /  Notes:   Depression Screen (20wks) [x]  04/09/2025   Offered classes at hospital (20wks) [x]   6/30/2025    Consent for delivery (24wks) [x]   05/01/2025   Circumcision Consent [x]  []  06/30/2025   TDAP (28wks) [x]  []  05/22/2025   Rhogam (28wks) []  [x]  B pos   Flu shot (OCT-MAY) [x]  []  01/05/2025   RSV shot (32+0 - 36+6wks and SEP-JAN) []  []     GBS collected (36wks) [x]   7/14/2025   Pre-registered []      Birth Certificate Info Given [x]   7/14/2025   Pediatrician selected [x]   Dr Mel Zayas

## 2025-07-28 ENCOUNTER — ROUTINE PRENATAL (OUTPATIENT)
Dept: OBSTETRICS AND GYNECOLOGY | Facility: CLINIC | Age: 25
End: 2025-07-28
Payer: COMMERCIAL

## 2025-07-28 VITALS
DIASTOLIC BLOOD PRESSURE: 70 MMHG | BODY MASS INDEX: 32.37 KG/M2 | WEIGHT: 182.75 LBS | SYSTOLIC BLOOD PRESSURE: 110 MMHG | HEART RATE: 98 BPM

## 2025-07-28 DIAGNOSIS — Z34.90 ENCOUNTER FOR SUPERVISION OF NORMAL PREGNANCY, ANTEPARTUM, UNSPECIFIED GRAVIDITY: Primary | ICD-10-CM

## 2025-07-28 PROCEDURE — 99999 PR PBB SHADOW E&M-EST. PATIENT-LVL II: CPT | Mod: PBBFAC,,, | Performed by: GENERAL PRACTICE

## 2025-07-28 PROCEDURE — 0502F SUBSEQUENT PRENATAL CARE: CPT | Mod: S$GLB,,, | Performed by: GENERAL PRACTICE

## 2025-07-28 NOTE — PROGRESS NOTES
"Iris Coronado  24 y.o.  MRN  [id number] PATIENT   2000   FINAL EDC:   8.10.25   by 9 wk US    Baby: Boy "Karishma"    SO Name: Junior ALLERGIES:    NKDA    Lab Results   Component Value Date    GRBSTREPPCR Positive (A) 2025     OB PROBLEM LIST:    NORM (on Fe supplementation)   TO-DO THROUGHOUT PREGNANCY:  Depression Screen: 4  Circumcision: consent 2025     Flu Shot: 25  TDAP:   Infant feeding: breast   Plans for epidural: yes  Classes at hospital: offered  PP contraception: ___  Delivery Consent: 25    Pediatrician: ___ MEDICATIONS:    PNV  Fe   TODAY'S PROGRESS NOTE  2025    24 y.o.  at 38w1d  Appointment type: routine    SUBJECTIVE   No: Yes:  Notes:   Vaginal bleeding: [x] []     Concern for ROM / leaking fluids: [x]  []     Pain, cramps, contractions: []  [x]  Contractions [place] all morning and afternoon   Normal fetal movement: []  [x]  [] n/a   Other questions or concerns: []  [x]  Feet swelling     OBJECTIVE  Vitals:    25 1435   BP: 110/70   Pulse: 98     FH: 38cm  DT'S: 135bpm by doppler  CEPHALIC by palpation.  EFW 7lbs by palpation    CVX 1/long/high/firm    ASSESSMENT AND PLAN   at 38w1d, doing well.    reviewed positive GBS results with the patient  labor precautions reviewed, C reviewed  RTC weekly until delivery, sooner prn    Lidia Shaver MD      LABS   INITIAL LABS:    DATE: 1/3/25  MBT: B positive  AB SCREEN: negative  TP-ABS: negative  RUBELLA: Immune  Hep B sAg: negative  Hep C Ab: negative  HIV: negative  H/H: 10.6 / 31.4  PLT: 234  VARICELLA: Immune  HGB: cannot rule out alpha thal  URINE CX: negative    PAP: PAP neg / Date: 23    BEV/CHLAM/TRICH: negative / Date: 25   OTHER LABS:    DATE: 25  cfDNA ([id number]): XY, neg for T13, T18, T21   CARRIER SCREEN (Inheritest Core): negative for CF, SMA, Fragile X      WBC 8.66 2025   HGB 10.5 (L) 2025   HCT 31.8 (L) 2025    " 05/19/2025   MCV 93 05/19/2025   FERRITIN 25.0 05/19/2025   OBGLUCOSESCR 129 05/19/2025   TREPABIGMIGG Non-Reactive 05/19/2025          ULTRASOUNDS   ANATOMY SCAN:    DATE: 3/26/25 @ 20wks:  SEX: male  EFW: 359  ANATOMY: wnl but incomplete  PLACENTA: anterior  CERVIX: normal length    DATE: 5.13.25  The fetal anatomic survey was completed today and no fetal structural abnormalities were noted.   Interval fetal growth has been normal (EFW 47%, AC 46%).   MVP is normal.    GROWTH US (6/30/2025 @ 34w1d ):  EFW 2564g = 41 percentile, cephalic presentation, (5lb 10oz)   HISTORY   MEDICAL HISTORY:    Pre-pregnancy BMI = 22   SURGICAL HISTORY:    none       OBSTETRIC HISTORY   Month/Year Mode of Delivery EGA Wt. M/F Epidural Complications / Comments   G1                                               SOCIAL HISTORY:    Smoker: non-smoker  Alcohol: yes but not since +HCG  Drugs: denies  Relationship: relationship with FOB  Domestic Violence: no  Lives with: Boyfriend  Education Level: Undergraduate Degree  Occupation: Dental assistant  Hindu: none GYN HISTORY:    PAP'S: no h/o abnormals  LAST PAP: PAP neg / Date: 5/30/2023  STD Hx: chlamydia  GENITAL HSV: no FAMILY HISTORY:    HTN: no (dad)  DIABETES: no (mom)  BLEEDING D/O: no  CLOTTING D/O: no  BIRTH DEFECTS: no  MENTAL DISABILITY: no  GYN CANCER: no

## 2025-07-29 NOTE — PATIENT INSTRUCTIONS
To schedule classes (see below for what's offered) at the hospital, call [phone].    Have a question or concern?    PRO TIP: Program these phone numbers in your cell phone!    for an emergency  call 911 or go to the nearest hospital Especially after 20 weeks of the pregnancy, please remember that  Labor & Delivery is at [facility].  There is no L&D at [facility] (formerly called [facility]).  After hours you can only access L&D through the Emergency Room (entrance on [facility]).   [facility] Nurse Care Advice Line  [phone] At any time during your pregnancy,  you can speak to a nurse 24-7.   for non-urgent issues, send us a  message in [employer] Consider calling the Nurse Care Advice Line if it's a weekend or  toward the end of the work-day since  [employer] and phone messages may not be answered for a day or two.   for non-urgent issues, call the clinic  [phone], Option 3    Labor & Delivery  [phone] Starting at 20 weeks of the pregnancy,  you can speak to a nurse on L&D 24-7.     THIRD TRIMESTER  29+0 - 42 weeks    Adapting to Pregnancy: Third Trimester   Some physical discomforts may seem worse in the final weeks of pregnancy. Simple lifestyle changes can help as you keep good posture and use good body mechanics.  Pay attention to your changing center of gravity.  Be kind to yourself and know your limits - your body is hosting an entire other human!  Ask for help if you need it.  Take fiber supplements, drink lots of water, and avoid prolonged sitting or standing to prevent constipation and hemorrhoids.  Be sure you're getting enough rest: avoid caffeine after 3pm, use a warm bath to relax before bedtime, ask for back/neck/shoulder massages from your partner, try drinking warm decaf tea or milk at bedtime, get heartburn under control.  Speaking of heartburnavoid spicy / acidic / sugary foods, especially in the evenings.  Eat slowly and in small amounts, and avoiding eating during the 2  hours before bedtime.  Try sleeping with your upper body elevated.    Your To-Do List  If you haven't already, get these important things done!  A few new tasks include having the carseat ready, having hospital bags packed, and making arrangements if needed for other children and/or pets while you're in the hospital.    We'll ask you to pre-register at the hospital around 36 weeks so you have a little less paperwork to do when the big day arrives.  You can walk-in at any time.  Go in the hospital's main entrance on [address] (near the pharmacy).  Walk in to Registration, which is across from the Information Desk.  You'll need your ID and insurance cards.    More information on these topics can be found in your OB Welcome Packet and the A-Z Book:  Choose a pediatrician for your baby.  Sign up for a tour and classes at the hospital.  All classes are free of charge if you are delivering at [facility].  Call [phone] to register for any of these classes:  Baby Love (learn about the delivery process and caring for a )  Big Brother / Big Sister Class (to help siblings prepare for baby)  Lamaze (a 4 week class to learn about natural interventions for labor)  Breastfeeding (get a head start learning about breastfeeding)  Work on some methods for coping with the pains of labor.  A good bit of labor happens before you can get an epidural, and you'll feel more confident if you have a plan that you've practiced.  We encourage everyone to breastfeed if they can (and most women can!).  Please ask us questions if you have them.  Decide what you'd like to use for contraception (birth control) after this baby is born.  If you're having a boy, let us know if you plan to have him circumcised.    Things to Look Out For  The Four Questions (please read more about these in your OB Welcome Packet): 1) Baby's Movements, 2) Contractions / Cramping, 3) Vaginal bleeding, 4) Leaking fluids  Mood swings and depression - some mood swings are  expected in pregnancy, but please ask for help if you are feeling overwhelmed or sad all the time.  Headaches that don't improve with rest, drinking water, and tylenol.  Severe swelling, especially of the face and hands. Remember some swelling of the lower legs is normal, especially if you have been on your feet for some time.    Intimacy   Unless your doctor tells you not to, it's still fine to continue having sex. The third trimester though can be a challenge comfort-wise. Try different positions and see what's best for you.    Baby!  Baby kicks and stretches despite running low on room, lanugo disappears, baby gains about a half of a pound per week, and bones harden (except for the skull, which remains soft and flexible for delivery).    OTHER INFORMATION:  If your provider orders labs or other studies, you probably won't hear from us unless something is abnormal.  How we define normal is different for many things in pregnancy.  This includes several of the numbers on a Complete Blood Count (CBC).  If your result is flagged as abnormal in MyChart but you don't hear from us, it's probably because things are actually normal based on where you are in your pregnancy.

## 2025-08-05 ENCOUNTER — ROUTINE PRENATAL (OUTPATIENT)
Dept: OBSTETRICS AND GYNECOLOGY | Facility: CLINIC | Age: 25
End: 2025-08-05
Payer: COMMERCIAL

## 2025-08-05 VITALS
DIASTOLIC BLOOD PRESSURE: 78 MMHG | BODY MASS INDEX: 32.92 KG/M2 | SYSTOLIC BLOOD PRESSURE: 118 MMHG | HEART RATE: 99 BPM | WEIGHT: 185.88 LBS

## 2025-08-05 DIAGNOSIS — Z3A.39 39 WEEKS GESTATION OF PREGNANCY: Primary | ICD-10-CM

## 2025-08-05 PROCEDURE — 99999 PR PBB SHADOW E&M-EST. PATIENT-LVL III: CPT | Mod: PBBFAC,,, | Performed by: OBSTETRICS & GYNECOLOGY

## 2025-08-05 RX ORDER — OXYTOCIN-SODIUM CHLORIDE 0.9% IV SOLN 30 UNIT/500ML 30-0.9/5 UT/ML-%
30 SOLUTION INTRAVENOUS ONCE AS NEEDED
OUTPATIENT
Start: 2025-08-05 | End: 2037-01-01

## 2025-08-05 RX ORDER — CALCIUM CARBONATE 200(500)MG
500 TABLET,CHEWABLE ORAL 3 TIMES DAILY PRN
OUTPATIENT
Start: 2025-08-05

## 2025-08-05 RX ORDER — TERBUTALINE SULFATE 1 MG/ML
0.25 INJECTION SUBCUTANEOUS
OUTPATIENT
Start: 2025-08-05

## 2025-08-05 RX ORDER — MUPIROCIN 20 MG/G
OINTMENT TOPICAL
OUTPATIENT
Start: 2025-08-05

## 2025-08-05 RX ORDER — OXYTOCIN 10 [USP'U]/ML
10 INJECTION, SOLUTION INTRAMUSCULAR; INTRAVENOUS ONCE AS NEEDED
OUTPATIENT
Start: 2025-08-05 | End: 2037-01-01

## 2025-08-05 RX ORDER — MISOPROSTOL 200 UG/1
800 TABLET ORAL ONCE AS NEEDED
OUTPATIENT
Start: 2025-08-05

## 2025-08-05 RX ORDER — OXYTOCIN-SODIUM CHLORIDE 0.9% IV SOLN 30 UNIT/500ML 30-0.9/5 UT/ML-%
10 SOLUTION INTRAVENOUS ONCE AS NEEDED
OUTPATIENT
Start: 2025-08-05 | End: 2037-01-01

## 2025-08-05 RX ORDER — LIDOCAINE HYDROCHLORIDE 10 MG/ML
10 INJECTION, SOLUTION INFILTRATION; PERINEURAL ONCE AS NEEDED
OUTPATIENT
Start: 2025-08-05 | End: 2037-01-01

## 2025-08-05 RX ORDER — MISOPROSTOL 200 UG/1
800 TABLET ORAL ONCE AS NEEDED
OUTPATIENT
Start: 2025-08-05 | End: 2037-01-01

## 2025-08-05 RX ORDER — SODIUM CHLORIDE, SODIUM LACTATE, POTASSIUM CHLORIDE, CALCIUM CHLORIDE 600; 310; 30; 20 MG/100ML; MG/100ML; MG/100ML; MG/100ML
INJECTION, SOLUTION INTRAVENOUS CONTINUOUS
OUTPATIENT
Start: 2025-08-05

## 2025-08-05 RX ORDER — METHYLERGONOVINE MALEATE 0.2 MG/ML
200 INJECTION INTRAVENOUS ONCE AS NEEDED
OUTPATIENT
Start: 2025-08-05 | End: 2037-01-01

## 2025-08-05 RX ORDER — SIMETHICONE 80 MG
1 TABLET,CHEWABLE ORAL 4 TIMES DAILY PRN
OUTPATIENT
Start: 2025-08-05

## 2025-08-05 RX ORDER — CARBOPROST TROMETHAMINE 250 UG/ML
250 INJECTION, SOLUTION INTRAMUSCULAR
OUTPATIENT
Start: 2025-08-05

## 2025-08-05 RX ORDER — DIPHENOXYLATE HYDROCHLORIDE AND ATROPINE SULFATE 2.5; .025 MG/1; MG/1
2 TABLET ORAL EVERY 6 HOURS PRN
OUTPATIENT
Start: 2025-08-05

## 2025-08-05 RX ORDER — ONDANSETRON 8 MG/1
8 TABLET, ORALLY DISINTEGRATING ORAL EVERY 8 HOURS PRN
OUTPATIENT
Start: 2025-08-05

## 2025-08-05 NOTE — PROGRESS NOTES
"  Prenatal Note   Chief Complaint:  Routine Prenatal Visit (39w2d )       Patient ID: Iris Coronado is a  24 y.o. female.    Date: 2025     Iris Coronado  24 y.o.  MRN  [id number] PATIENT   2000   FINAL EDC:   August 10, 2025  by 9 wk US    Baby: Boy "Colsen"    SO Name: Junior ALLERGIES:    NKDA    Lab Results   Component Value Date    GRBSTREPPCR Positive (A) 2025     OB PROBLEM LIST:    Iron-deficiency anemia (on Fe supplementation)   TO-DO THROUGHOUT PREGNANCY:  Depression Screen: 25  Circumcision: consent 2025     Flu Shot: 25  TDAP:   Infant feeding: breast   Plans for epidural: yes  Classes at hospital: offered  PP contraception: ___  Delivery Consent: 25    Pediatrician: ___ MEDICATIONS:    PNV  Fe   TODAY'S PROGRESS NOTE    Subjective   24 y.o. y.o.  at 39-2/7 weeks who presents for routine prenatal evaluation.    She would like to discuss the possibility of induction of labor in the near future.  She reports normal FM..    She denies vaginal bleeding.  She denies leakage of fluid.  She does report some increasing uterine activity and Vadim Meyer type contractions.  She denies pelvic pressure.   She denies headaches, vision changes or right upper quadrant pain.    Objective  VITALS:  BP: 118/78    Vitals:    25 1319   BP: 118/78   Pulse: 99     Wt Readings from Last 1 Encounters:   25 84.3 kg (185 lb 13.6 oz)       FH: 38 cm  FHT'S: 140's bpm by doppler    Dilation:  Fingertip cm  Effacement:  50 %  Station: -  3  Position:  mid   Presentation: Cephalic    Assessment & Plan  Intrauterine pregnancy at 39-2/7 weeks  Group B strep positive  Iron-deficiency anemia      The above was reviewed discussed with the patient and FOB.    From an OB standpoint she is currently doing well.      The pros cons risks benefits alternatives indications of elective induction of labor were discussed.  The patient and FOB these questions were answered " and she will be scheduled to present on labor and delivery at midnight between Sunday August 10th and Monday August 11th to initiate the induction process.    3rd-trimester pregnancy issues and indications for evaluation on labor and delivery discussed.      The patient's questions regarding the above were answered and she is in agreement with the current plan.       LABS   INITIAL LABS:    DATE: 1/3/25  MBT: B positive  AB SCREEN: negative  TP-ABS: negative  RUBELLA: Immune  Hep B sAg: negative  Hep C Ab: negative  HIV: negative  H/H: 10.6 / 31.4  PLT: 234  VARICELLA: Immune  HGB: cannot rule out alpha thal  URINE CX: negative    PAP: PAP neg / Date: 5.24.23    BEV/CHLAM/TRICH: negative / Date: 1.8.25   OTHER LABS:    DATE: 1/16/25  cfDNA ([id number]): XY, neg for T13, T18, T21   CARRIER SCREEN ([employer]): negative for CF, SMA, Fragile X      WBC 8.66 05/19/2025   HGB 10.5 (L) 05/19/2025   HCT 31.8 (L) 05/19/2025    05/19/2025   MCV 93 05/19/2025   FERRITIN 25.0 05/19/2025   OBGLUCOSESCR 129 05/19/2025   TREPABIGMIGG Non-Reactive 05/19/2025          ULTRASOUNDS   ANATOMY SCAN:    DATE: 3/26/25 @ 20wks:  SEX: male  EFW: 359  ANATOMY: wnl but incomplete  PLACENTA: anterior  CERVIX: normal length    DATE: 5.13.25  The fetal anatomic survey was completed today and no fetal structural abnormalities were noted.   Interval fetal growth has been normal (EFW 47%, AC 46%).   MVP is normal.    GROWTH US (6/30/2025 @ 34w1d ):  EFW 2564g = 41 percentile, cephalic presentation, (5lb 10oz)   HISTORY   MEDICAL HISTORY:    Pre-pregnancy BMI = 22   SURGICAL HISTORY:    none       OBSTETRIC HISTORY   Month/Year Mode of Delivery EGA Wt. M/F Epidural Complications / Comments   G1                                               SOCIAL HISTORY:    Smoker: non-smoker  Alcohol: yes but not since +HCG  Drugs: denies  Relationship: relationship with FOB  Domestic Violence: no  Lives with: Boyfriend  Education Level: Undergraduate  Degree  Occupation: Dental assistant  Moravian: none GYN HISTORY:    PAP'S: no h/o abnormals  LAST PAP: PAP neg / Date: 5/30/2023  STD Hx: chlamydia  GENITAL HSV: no FAMILY HISTORY:    HTN: no (dad)  DIABETES: no (mom)  BLEEDING D/O: no  CLOTTING D/O: no  BIRTH DEFECTS: no  MENTAL DISABILITY: no  GYN CANCER: no         Plan:      39 weeks gestation of pregnancy  -     IP OB Labor Induction; Future  -     Vital Signs; Standing  -     Vital signs- Early Labor(0-4 cm); Standing  -     Vital Signs- Active Labor (4-10 cm); Standing  -     Vital Signs Post Delivery; Standing  -     Check Temperature, Membranes Intact; Standing  -     Check Temperature, Membranes Ruptured; Standing  -     Pulse Oximetry Continous while CONTINUOUS electronic fetal Monitor in use; Standing  -     Cervical Exam; Standing  -     Fetal / Uterine Monitoring - Continuous; Standing  -     Fetal monitoring - scalp electrode; Standing  -     Insert peripheral IV; Standing  -     Schuler to Gravity; Standing  -     Schuler Catheter Care every 12 hours; Standing  -     Nurse to discontinue schuler when patient no longer meets criteria; Standing  -     Post Schuler Catheter Removal Protocol; Standing  -     Perform Bladder scan; Standing  -     Perform Intermittent Catheterization; Standing  -     Perform Bladder Scan, post intermittent cath; Standing  -     Place indwelling catheter; Standing  -     Watch for excessive vaginal bleeding; Standing  -     Chlorhexidine Bath; Standing  -     Decrease Oxytocin; Standing  -     Discontinue oxytocin; Standing  -     Oxytocin Titration Resumption; Standing  -     Amnioinfusion PRN recurrent variable decelerations; Standing  -     Administer a 500 -1000 ml IV fluid bolus as needed for; Standing  -     Assess fundal height/uterine firmness, lochia, perineum; Standing  -     Notify Physician; Standing  -     Notify OB care provider; Standing  -     Notify physician of cervical change or lack of change; Standing  -      Notify physician for excessive uterine activity; Standing  -     Notify physician for Fetal Heart Tones consistent with Category II or Category III tracing; Standing  -     Notify Pediatrician after delivery; Standing  -     Notify Nursery / Level II Nursery; Standing  -     Notify Nursery / Level II Nursery; Standing  -     Abdominal prep for  Section; Standing  -     Chlorhexidine (CHG) 2% Wipes; Standing  -     Chlorohexidine Gluconate Bath; Standing  -     Vital signs every 15 minutes; Standing  -     Vital signs every 15 minutes; Standing  -     miSOPROStoL tablet 800 mcg  -     miSOPROStoL tablet 800 mcg  -     CBC with Auto Differential; Standing  -     Type & Screen, Labor & Delivery; Standing  -     Treponema Pallidium Antibodies IgG, IgM; Standing  -     POCT Cord Blood Gas Umbilical Artery Cord Gas; Standing  -     POCT Cord Blood Gas Umbilical Vein Cord Gas; Standing  -     Inpatient consult to Anesthesiology; Standing  -     Full code; Standing  -     Admit to Inpatient; Standing  -     Diet Adult Regular; Standing  -     Diet Clear Liquid; Standing  -     Place sequential compression device; Standing  -     Decrease Oxytocin by 2 mu/min PRN for:; Standing  -     Decrease the oxytocin by 50% if the FHR tracing shows no improvement or there is no resolution of tachysystole within 30 minutes or if the FHR tracing worsens despite the reduction by 2mU/min; Standing  -     Discontinue the oxytocin infusion if the non-reassuring FHR tracing or tachysystole do not resolve within 30 minutes or worsens despite the 50% reduction in the oxytocin rate; Standing  -     For tachysystole :; Standing  -     Decrease Oxytocin by 2 mu/min PRN for tachysystole that persists after lateral position change and (if not contraindicated) an IV fluid bolus; Standing  -     Decrease Oxytocin by 50% if tachysystole persists after decreasing by 2 mu/min after 30 minutes.; Standing  -     Discontinue Oxytocin PRN if  tachysystole persists after decreasing Oxytocin by 50% after 30 minutes.; Standing  -     Resume Oxytocin when uterine activity is appropriate and fetal oxygenation is demonstrated.; Standing  -     Notify OB; Standing  -     Saline lock IV; Standing  -     Perform baseline 20 minute non stress test - proceed with other orders if reactive criteria met.; Standing  -     misoprostol split tablet 50 mcg  -     Maternal vital signs, fetal and uterine assessments after each dose and hourly; Standing  -     Electronic fetal monitoring; Standing  -     May ambulate 30 minutes after insertion of Misoprostol (Cytotec); Standing  -     Withhold Misoprostol (Cytotec) dosing:; Standing  -     Oxytocin should be delayed until at least 4 hours after the last Misoprostol (Cytotec) dose administered; Standing    Other orders  -     Change position, roll left or right; Standing  -     lactated ringers bolus 1,000 mL  -     lactated ringers bolus 500 mL  -     lactated ringers infusion  -     IP VTE LOW RISK PATIENT; Standing  -     mupirocin 2 % ointment  -     oxytocin 15 units/250 mL (60 milliunits/mL) in 0.9% NaCl IV bolus from bag  -     oxytocin (PITOCIN) 15 Units in 0.9% NaCl 250 mL infusion  -     ondansetron disintegrating tablet 8 mg  -     calcium carbonate 200 mg calcium (500 mg) chewable tablet 500 mg  -     simethicone chewable tablet 80 mg  -     LIDOcaine HCL 10 mg/ml (1%) injection 10 mL  -     oxytocin 15 units/250 mL (60 milliunits/mL) in 0.9% NaCl IV bolus from bag  -     oxytocin (PITOCIN) 15 Units in 0.9% NaCl 250 mL infusion  -     oxytocin injection 10 Units  -     methylergonovine injection 200 mcg  -     carboprost injection 250 mcg  -     tranexamic acid (CYKLOKAPRON) 1,000 mg in 0.9% NaCl 100 mL IVPB (MB+)  -     diphenoxylate-atropine 2.5-0.025 mg per tablet 2 tablet  -     penicillin G potassium 5 Million Units in D5W 100 mL IVPB (MB+)  -     penicillin G potassium 3 Million Units in D5W 100 mL IVPB  -      lactated ringers bolus 500 mL  -     oxytocin (PITOCIN) 15 Units in 0.9% NaCl 250 mL infusion  -     terbutaline injection 0.25 mg         Follow up for Routine postpartum evaluation.     Christiano Rush MD  Department OBGYN [facility]

## 2025-08-05 NOTE — PATIENT INSTRUCTIONS
To schedule classes (see below for what's offered) at the hospital, call [phone].    Have a question or concern?    PRO TIP: Program these phone numbers in your cell phone!    for an emergency  call 911 or go to the nearest hospital Especially after 20 weeks of the pregnancy, please remember that  Labor & Delivery is at [facility].  There is no L&D at [facility] ([facility]  After hours you can only access L&D through the Emergency Room (entrance on [facility]).   [facility] Nurse Care Advice Line  [phone] At any time during your pregnancy,  you can speak to a nurse 24-7.   for non-urgent issues, send us a  message in [employer] Consider calling the Nurse Care Advice Line if it's a weekend or  toward the end of the work-day since  [employer] and phone messages may not be answered for a day or two.   for non-urgent issues, call the clinic  [phone], Option 3    Labor & Delivery  [phone] Starting at 20 weeks of the pregnancy,  you can speak to a nurse on L&D 24-7.     THIRD TRIMESTER  29+0 - 42 weeks    Adapting to Pregnancy: Third Trimester   Some physical discomforts may seem worse in the final weeks of pregnancy. Simple lifestyle changes can help as you keep good posture and use good body mechanics.  Pay attention to your changing center of gravity.  Be kind to yourself and know your limits - your body is hosting an entire other human!  Ask for help if you need it.  Take fiber supplements, drink lots of water, and avoid prolonged sitting or standing to prevent constipation and hemorrhoids.  Be sure you're getting enough rest: avoid caffeine after 3pm, use a warm bath to relax before bedtime, ask for back/neck/shoulder massages from your partner, try drinking warm decaf tea or milk at bedtime, get heartburn under control.  Speaking of heartburnavoid spicy / acidic / sugary foods, especially in the evenings.  Eat slowly and in small amounts, and avoiding eating during the 2  hours before bedtime.  Try sleeping with your upper body elevated.    Your To-Do List  If you haven't already, get these important things done!  A few new tasks include having the carseat ready, having hospital bags packed, and making arrangements if needed for other children and/or pets while you're in the hospital.    We'll ask you to pre-register at the hospital around 36 weeks so you have a little less paperwork to do when the big day arrives.  You can walk-in at any time.  Go in the hospital's main entrance on [address] (near the pharmacy).  Walk in to Registration, which is across from the Information Desk.  You'll need your ID and insurance cards.    More information on these topics can be found in your OB Welcome Packet and the A-Z Book:  Choose a pediatrician for your baby.  Sign up for a tour and classes at the hospital.  All classes are free of charge if you are delivering at [facility].  Call [phone] to register for any of these classes:  Baby Love (learn about the delivery process and caring for a )  Big Brother / Big Sister Class (to help siblings prepare for baby)  Lamaze (a 4 week class to learn about natural interventions for labor)  Breastfeeding (get a head start learning about breastfeeding)  Work on some methods for coping with the pains of labor.  A good bit of labor happens before you can get an epidural, and you'll feel more confident if you have a plan that you've practiced.  We encourage everyone to breastfeed if they can (and most women can!).  Please ask us questions if you have them.  Decide what you'd like to use for contraception (birth control) after this baby is born.  If you're having a boy, let us know if you plan to have him circumcised.    Things to Look Out For  The Four Questions (please read more about these in your OB Welcome Packet): 1) Baby's Movements, 2) Contractions / Cramping, 3) Vaginal bleeding, 4) Leaking fluids  Mood swings and depression - some mood swings are  expected in pregnancy, but please ask for help if you are feeling overwhelmed or sad all the time.  Headaches that don't improve with rest, drinking water, and tylenol.  Severe swelling, especially of the face and hands. Remember some swelling of the lower legs is normal, especially if you have been on your feet for some time.    Intimacy   Unless your doctor tells you not to, it's still fine to continue having sex. The third trimester though can be a challenge comfort-wise. Try different positions and see what's best for you.    Baby!  Baby kicks and stretches despite running low on room, lanugo disappears, baby gains about a half of a pound per week, and bones harden (except for the skull, which remains soft and flexible for delivery).    OTHER INFORMATION:  If your provider orders labs or other studies, you probably won't hear from us unless something is abnormal.  How we define normal is different for many things in pregnancy.  This includes several of the numbers on a Complete Blood Count (CBC).  If your result is flagged as abnormal in MyChart but you don't hear from us, it's probably because things are actually normal based on where you are in your pregnancy.

## 2025-08-11 ENCOUNTER — HOSPITAL ENCOUNTER (INPATIENT)
Facility: HOSPITAL | Age: 25
LOS: 3 days | Discharge: HOME | End: 2025-08-14
Attending: GENERAL PRACTICE | Admitting: GENERAL PRACTICE
Payer: COMMERCIAL

## 2025-08-11 ENCOUNTER — ANESTHESIA (OUTPATIENT)
Dept: OBSTETRICS AND GYNECOLOGY | Facility: HOSPITAL | Age: 25
End: 2025-08-11
Payer: COMMERCIAL

## 2025-08-11 ENCOUNTER — ANESTHESIA EVENT (OUTPATIENT)
Dept: OBSTETRICS AND GYNECOLOGY | Facility: HOSPITAL | Age: 25
End: 2025-08-11
Payer: COMMERCIAL

## 2025-08-11 DIAGNOSIS — Z3A.39 39 WEEKS GESTATION OF PREGNANCY: ICD-10-CM

## 2025-08-11 DIAGNOSIS — Z98.891 STATUS POST CESAREAN SECTION: ICD-10-CM

## 2025-08-11 LAB
ABSOLUTE EOSINOPHIL (SMH): 0.89 K/UL
ABSOLUTE MONOCYTE (SMH): 1.03 K/UL (ref 0.3–1)
ABSOLUTE NEUTROPHIL COUNT (SMH): 8.7 K/UL (ref 1.8–7.7)
AMPHET UR QL SCN: NEGATIVE
BARBITURATE SCN PRESENT UR: NEGATIVE
BASOPHILS # BLD AUTO: 0.06 K/UL
BASOPHILS NFR BLD AUTO: 0.5 %
BENZODIAZ UR QL SCN: NEGATIVE
BUPRENORPHINE UR QL SCN: NEGATIVE
CANNABINOIDS UR QL SCN: NEGATIVE
COCAINE UR QL SCN: NEGATIVE
CREAT UR-MCNC: 38.3 MG/DL (ref 15–325)
ERYTHROCYTE [DISTWIDTH] IN BLOOD BY AUTOMATED COUNT: 12.5 % (ref 11.5–14.5)
FENTANYL UR QL SCN: NEGATIVE
GROUP & RH: NORMAL
HCT VFR BLD AUTO: 30.8 % (ref 37–48.5)
HGB BLD-MCNC: 10.2 GM/DL (ref 12–16)
IMM GRANULOCYTES # BLD AUTO: 0.17 K/UL (ref 0–0.04)
IMM GRANULOCYTES NFR BLD AUTO: 1.3 % (ref 0–0.5)
INDIRECT COOMBS: NORMAL
LYMPHOCYTES # BLD AUTO: 1.91 K/UL (ref 1–4.8)
MCH RBC QN AUTO: 29.2 PG (ref 27–31)
MCHC RBC AUTO-ENTMCNC: 33.1 G/DL (ref 32–36)
MCV RBC AUTO: 88 FL (ref 82–98)
NUCLEATED RBC (/100WBC) (SMH): 0 /100 WBC
OPIATES UR QL SCN: NEGATIVE
PCP UR QL: NEGATIVE
PLATELET # BLD AUTO: 234 K/UL (ref 150–450)
PMV BLD AUTO: 9.8 FL (ref 9.2–12.9)
RBC # BLD AUTO: 3.49 M/UL (ref 4–5.4)
RELATIVE EOSINOPHIL (SMH): 7 % (ref 0–8)
RELATIVE LYMPHOCYTE (SMH): 15 % (ref 18–48)
RELATIVE MONOCYTE (SMH): 8.1 % (ref 4–15)
RELATIVE NEUTROPHIL (SMH): 68.1 % (ref 38–73)
T PALLIDUM IGG+IGM SER QL: NORMAL
WBC # BLD AUTO: 12.72 K/UL (ref 3.9–12.7)

## 2025-08-11 PROCEDURE — 3E0DXGC INTRODUCTION OF OTHER THERAPEUTIC SUBSTANCE INTO MOUTH AND PHARYNX, EXTERNAL APPROACH: ICD-10-PCS | Performed by: GENERAL PRACTICE

## 2025-08-11 PROCEDURE — 62326 NJX INTERLAMINAR LMBR/SAC: CPT | Performed by: ANESTHESIOLOGY

## 2025-08-11 PROCEDURE — 80307 DRUG TEST PRSMV CHEM ANLYZR: CPT | Performed by: OBSTETRICS & GYNECOLOGY

## 2025-08-11 PROCEDURE — 86593 SYPHILIS TEST NON-TREP QUANT: CPT | Performed by: OBSTETRICS & GYNECOLOGY

## 2025-08-11 PROCEDURE — 85025 COMPLETE CBC W/AUTO DIFF WBC: CPT | Performed by: OBSTETRICS & GYNECOLOGY

## 2025-08-11 PROCEDURE — 63600175 PHARM REV CODE 636 W HCPCS: Performed by: OBSTETRICS & GYNECOLOGY

## 2025-08-11 PROCEDURE — 3E033VJ INTRODUCTION OF OTHER HORMONE INTO PERIPHERAL VEIN, PERCUTANEOUS APPROACH: ICD-10-PCS | Performed by: GENERAL PRACTICE

## 2025-08-11 PROCEDURE — 27200710 HC EPIDURAL INFUSION PUMP SET: Performed by: ANESTHESIOLOGY

## 2025-08-11 PROCEDURE — 63600175 PHARM REV CODE 636 W HCPCS: Performed by: ANESTHESIOLOGY

## 2025-08-11 PROCEDURE — 51702 INSERT TEMP BLADDER CATH: CPT

## 2025-08-11 PROCEDURE — 36415 COLL VENOUS BLD VENIPUNCTURE: CPT | Performed by: OBSTETRICS & GYNECOLOGY

## 2025-08-11 PROCEDURE — 25000003 PHARM REV CODE 250: Performed by: OBSTETRICS & GYNECOLOGY

## 2025-08-11 PROCEDURE — 11000001 HC ACUTE MED/SURG PRIVATE ROOM

## 2025-08-11 PROCEDURE — 63600175 PHARM REV CODE 636 W HCPCS: Performed by: GENERAL PRACTICE

## 2025-08-11 PROCEDURE — 99499 UNLISTED E&M SERVICE: CPT | Mod: ,,, | Performed by: GENERAL PRACTICE

## 2025-08-11 PROCEDURE — C1751 CATH, INF, PER/CENT/MIDLINE: HCPCS | Performed by: ANESTHESIOLOGY

## 2025-08-11 PROCEDURE — 86850 RBC ANTIBODY SCREEN: CPT | Performed by: OBSTETRICS & GYNECOLOGY

## 2025-08-11 PROCEDURE — 25000003 PHARM REV CODE 250: Performed by: ANESTHESIOLOGY

## 2025-08-11 PROCEDURE — 25000003 PHARM REV CODE 250: Performed by: GENERAL PRACTICE

## 2025-08-11 RX ORDER — CALCIUM CARBONATE 200(500)MG
500 TABLET,CHEWABLE ORAL 3 TIMES DAILY PRN
Status: DISCONTINUED | OUTPATIENT
Start: 2025-08-11 | End: 2025-08-12

## 2025-08-11 RX ORDER — NALOXONE HCL 0.4 MG/ML
0.4 VIAL (ML) INJECTION SEE ADMIN INSTRUCTIONS
Status: DISCONTINUED | OUTPATIENT
Start: 2025-08-11 | End: 2025-08-14 | Stop reason: HOSPADM

## 2025-08-11 RX ORDER — EPHEDRINE SULFATE 50 MG/ML
10 INJECTION, SOLUTION INTRAVENOUS ONCE AS NEEDED
Status: DISCONTINUED | OUTPATIENT
Start: 2025-08-11 | End: 2025-08-12

## 2025-08-11 RX ORDER — ONDANSETRON HYDROCHLORIDE 2 MG/ML
4 INJECTION, SOLUTION INTRAVENOUS EVERY 6 HOURS PRN
Status: DISCONTINUED | OUTPATIENT
Start: 2025-08-11 | End: 2025-08-12

## 2025-08-11 RX ORDER — ROPIVACAINE HYDROCHLORIDE 2 MG/ML
20 INJECTION, SOLUTION EPIDURAL; INFILTRATION ONCE
Status: COMPLETED | OUTPATIENT
Start: 2025-08-11 | End: 2025-08-11

## 2025-08-11 RX ORDER — SIMETHICONE 80 MG
1 TABLET,CHEWABLE ORAL 4 TIMES DAILY PRN
Status: DISCONTINUED | OUTPATIENT
Start: 2025-08-11 | End: 2025-08-12

## 2025-08-11 RX ORDER — FENTANYL/BUPIVACAINE/NS/PF 2MCG/ML-.1
10 PLASTIC BAG, INJECTION (ML) INJECTION CONTINUOUS
Refills: 0 | Status: DISCONTINUED | OUTPATIENT
Start: 2025-08-11 | End: 2025-08-12

## 2025-08-11 RX ORDER — OXYTOCIN-SODIUM CHLORIDE 0.9% IV SOLN 30 UNIT/500ML 30-0.9/5 UT/ML-%
0-32 SOLUTION INTRAVENOUS CONTINUOUS
Status: DISCONTINUED | OUTPATIENT
Start: 2025-08-11 | End: 2025-08-12

## 2025-08-11 RX ORDER — EPHEDRINE SULFATE 50 MG/ML
10 INJECTION, SOLUTION INTRAVENOUS ONCE AS NEEDED
Status: DISCONTINUED | OUTPATIENT
Start: 2025-08-11 | End: 2025-08-11

## 2025-08-11 RX ORDER — ROPIVACAINE HYDROCHLORIDE 2 MG/ML
INJECTION, SOLUTION EPIDURAL; INFILTRATION
Status: DISCONTINUED | OUTPATIENT
Start: 2025-08-11 | End: 2025-08-12

## 2025-08-11 RX ORDER — BUTORPHANOL TARTRATE 2 MG/ML
2 INJECTION, SOLUTION INTRAMUSCULAR; INTRAVENOUS ONCE
Status: COMPLETED | OUTPATIENT
Start: 2025-08-11 | End: 2025-08-11

## 2025-08-11 RX ORDER — ONDANSETRON 4 MG/1
8 TABLET, ORALLY DISINTEGRATING ORAL EVERY 8 HOURS PRN
Status: DISCONTINUED | OUTPATIENT
Start: 2025-08-11 | End: 2025-08-12

## 2025-08-11 RX ORDER — FENTANYL/BUPIVACAINE/NS/PF 2MCG/ML-.1
14 PLASTIC BAG, INJECTION (ML) INJECTION CONTINUOUS
Refills: 0 | Status: DISCONTINUED | OUTPATIENT
Start: 2025-08-11 | End: 2025-08-12

## 2025-08-11 RX ORDER — ONDANSETRON HYDROCHLORIDE 2 MG/ML
4 INJECTION, SOLUTION INTRAVENOUS ONCE
Status: COMPLETED | OUTPATIENT
Start: 2025-08-11 | End: 2025-08-11

## 2025-08-11 RX ORDER — SODIUM CHLORIDE, SODIUM LACTATE, POTASSIUM CHLORIDE, CALCIUM CHLORIDE 600; 310; 30; 20 MG/100ML; MG/100ML; MG/100ML; MG/100ML
INJECTION, SOLUTION INTRAVENOUS CONTINUOUS
Status: DISCONTINUED | OUTPATIENT
Start: 2025-08-11 | End: 2025-08-12

## 2025-08-11 RX ORDER — TERBUTALINE SULFATE 1 MG/ML
0.25 INJECTION SUBCUTANEOUS
Status: DISCONTINUED | OUTPATIENT
Start: 2025-08-11 | End: 2025-08-12

## 2025-08-11 RX ORDER — OXYTOCIN-SODIUM CHLORIDE 0.9% IV SOLN 30 UNIT/500ML 30-0.9/5 UT/ML-%
10 SOLUTION INTRAVENOUS ONCE AS NEEDED
Status: DISCONTINUED | OUTPATIENT
Start: 2025-08-11 | End: 2025-08-12

## 2025-08-11 RX ORDER — DIPHENHYDRAMINE HYDROCHLORIDE 50 MG/ML
12.5 INJECTION, SOLUTION INTRAMUSCULAR; INTRAVENOUS EVERY 4 HOURS PRN
Status: DISCONTINUED | OUTPATIENT
Start: 2025-08-11 | End: 2025-08-14 | Stop reason: HOSPADM

## 2025-08-11 RX ADMIN — MISOPROSTOL 50 MCG: 100 TABLET ORAL at 08:08

## 2025-08-11 RX ADMIN — SODIUM CHLORIDE, POTASSIUM CHLORIDE, SODIUM LACTATE AND CALCIUM CHLORIDE: 600; 310; 30; 20 INJECTION, SOLUTION INTRAVENOUS at 08:08

## 2025-08-11 RX ADMIN — ROPIVACAINE HYDROCHLORIDE 20 ML: 2 INJECTION EPIDURAL; INFILTRATION; PERINEURAL at 09:08

## 2025-08-11 RX ADMIN — DEXTROSE MONOHYDRATE 5 MILLION UNITS: 5 INJECTION INTRAVENOUS at 06:08

## 2025-08-11 RX ADMIN — ONDANSETRON 4 MG: 2 INJECTION INTRAMUSCULAR; INTRAVENOUS at 06:08

## 2025-08-11 RX ADMIN — ROPIVACAINE HYDROCHLORIDE 4 ML: 2 INJECTION, SOLUTION EPIDURAL; INFILTRATION at 08:08

## 2025-08-11 RX ADMIN — DEXTROSE MONOHYDRATE 3 MILLION UNITS: 50 INJECTION, SOLUTION INTRAVENOUS at 08:08

## 2025-08-11 RX ADMIN — DEXTROSE MONOHYDRATE 3 MILLION UNITS: 50 INJECTION, SOLUTION INTRAVENOUS at 10:08

## 2025-08-11 RX ADMIN — SODIUM CHLORIDE, SODIUM LACTATE, POTASSIUM CHLORIDE, AND CALCIUM CHLORIDE 1000 ML: .6; .31; .03; .02 INJECTION, SOLUTION INTRAVENOUS at 04:08

## 2025-08-11 RX ADMIN — MISOPROSTOL 50 MCG: 100 TABLET ORAL at 12:08

## 2025-08-11 RX ADMIN — Medication 14 ML/HR: at 08:08

## 2025-08-11 RX ADMIN — BUTORPHANOL TARTRATE 2 MG: 2 INJECTION, SOLUTION INTRAMUSCULAR; INTRAVENOUS at 03:08

## 2025-08-11 RX ADMIN — DEXTROSE MONOHYDRATE 3 MILLION UNITS: 50 INJECTION, SOLUTION INTRAVENOUS at 02:08

## 2025-08-11 RX ADMIN — BUTORPHANOL TARTRATE 2 MG: 2 INJECTION, SOLUTION INTRAMUSCULAR; INTRAVENOUS at 06:08

## 2025-08-11 RX ADMIN — MISOPROSTOL 50 MCG: 100 TABLET ORAL at 04:08

## 2025-08-12 VITALS — HEART RATE: 101 BPM | DIASTOLIC BLOOD PRESSURE: 56 MMHG | OXYGEN SATURATION: 99 % | SYSTOLIC BLOOD PRESSURE: 114 MMHG

## 2025-08-12 PROCEDURE — 63600175 PHARM REV CODE 636 W HCPCS

## 2025-08-12 PROCEDURE — 25000003 PHARM REV CODE 250: Performed by: GENERAL PRACTICE

## 2025-08-12 PROCEDURE — 99900031 HC PATIENT EDUCATION (STAT)

## 2025-08-12 PROCEDURE — 37000009 HC ANESTHESIA EA ADD 15 MINS: Performed by: GENERAL PRACTICE

## 2025-08-12 PROCEDURE — 63600175 PHARM REV CODE 636 W HCPCS: Performed by: GENERAL PRACTICE

## 2025-08-12 PROCEDURE — 27201423 OPTIME MED/SURG SUP & DEVICES STERILE SUPPLY: Performed by: GENERAL PRACTICE

## 2025-08-12 PROCEDURE — 63600175 PHARM REV CODE 636 W HCPCS: Performed by: ANESTHESIOLOGY

## 2025-08-12 PROCEDURE — 94799 UNLISTED PULMONARY SVC/PX: CPT

## 2025-08-12 PROCEDURE — 99024 POSTOP FOLLOW-UP VISIT: CPT | Mod: ,,, | Performed by: GENERAL PRACTICE

## 2025-08-12 PROCEDURE — 71000039 HC RECOVERY, EACH ADD'L HOUR: Performed by: GENERAL PRACTICE

## 2025-08-12 PROCEDURE — 63600175 PHARM REV CODE 636 W HCPCS: Performed by: OBSTETRICS & GYNECOLOGY

## 2025-08-12 PROCEDURE — 71000033 HC RECOVERY, INTIAL HOUR: Performed by: GENERAL PRACTICE

## 2025-08-12 PROCEDURE — 94761 N-INVAS EAR/PLS OXIMETRY MLT: CPT

## 2025-08-12 PROCEDURE — 63600175 PHARM REV CODE 636 W HCPCS: Mod: JZ,TB | Performed by: GENERAL PRACTICE

## 2025-08-12 PROCEDURE — 36004725 HC OB OR TIME LEV III - EA ADD 15 MIN: Performed by: GENERAL PRACTICE

## 2025-08-12 PROCEDURE — 99900035 HC TECH TIME PER 15 MIN (STAT)

## 2025-08-12 PROCEDURE — 25000003 PHARM REV CODE 250: Performed by: ANESTHESIOLOGY

## 2025-08-12 PROCEDURE — 37000008 HC ANESTHESIA 1ST 15 MINUTES: Performed by: GENERAL PRACTICE

## 2025-08-12 PROCEDURE — 36004724 HC OB OR TIME LEV III - 1ST 15 MIN: Performed by: GENERAL PRACTICE

## 2025-08-12 PROCEDURE — 11000001 HC ACUTE MED/SURG PRIVATE ROOM

## 2025-08-12 PROCEDURE — 59510 CESAREAN DELIVERY: CPT | Mod: ,,, | Performed by: GENERAL PRACTICE

## 2025-08-12 PROCEDURE — 25000003 PHARM REV CODE 250

## 2025-08-12 RX ORDER — PRENATAL WITH FERROUS FUM AND FOLIC ACID 3080; 920; 120; 400; 22; 1.84; 3; 20; 10; 1; 12; 200; 27; 25; 2 [IU]/1; [IU]/1; MG/1; [IU]/1; MG/1; MG/1; MG/1; MG/1; MG/1; MG/1; UG/1; MG/1; MG/1; MG/1; MG/1
1 TABLET ORAL DAILY
Status: DISCONTINUED | OUTPATIENT
Start: 2025-08-12 | End: 2025-08-14 | Stop reason: HOSPADM

## 2025-08-12 RX ORDER — OXYTOCIN-SODIUM CHLORIDE 0.9% IV SOLN 30 UNIT/500ML 30-0.9/5 UT/ML-%
SOLUTION INTRAVENOUS
Status: DISCONTINUED | OUTPATIENT
Start: 2025-08-12 | End: 2025-08-12

## 2025-08-12 RX ORDER — CARBOPROST TROMETHAMINE 250 UG/ML
250 INJECTION, SOLUTION INTRAMUSCULAR
Status: DISCONTINUED | OUTPATIENT
Start: 2025-08-12 | End: 2025-08-14 | Stop reason: HOSPADM

## 2025-08-12 RX ORDER — OXYTOCIN-SODIUM CHLORIDE 0.9% IV SOLN 30 UNIT/500ML 30-0.9/5 UT/ML-%
30 SOLUTION INTRAVENOUS ONCE AS NEEDED
Status: DISCONTINUED | OUTPATIENT
Start: 2025-08-12 | End: 2025-08-14 | Stop reason: HOSPADM

## 2025-08-12 RX ORDER — IBUPROFEN 400 MG/1
800 TABLET, FILM COATED ORAL EVERY 8 HOURS PRN
Status: DISCONTINUED | OUTPATIENT
Start: 2025-08-12 | End: 2025-08-14 | Stop reason: HOSPADM

## 2025-08-12 RX ORDER — METHYLERGONOVINE MALEATE 0.2 MG/ML
200 INJECTION INTRAVENOUS ONCE AS NEEDED
Status: DISCONTINUED | OUTPATIENT
Start: 2025-08-12 | End: 2025-08-12

## 2025-08-12 RX ORDER — NALOXONE HCL 0.4 MG/ML
0.04 VIAL (ML) INJECTION
Status: DISCONTINUED | OUTPATIENT
Start: 2025-08-12 | End: 2025-08-12

## 2025-08-12 RX ORDER — KETOROLAC TROMETHAMINE 30 MG/ML
30 INJECTION, SOLUTION INTRAMUSCULAR; INTRAVENOUS EVERY 6 HOURS
Status: COMPLETED | OUTPATIENT
Start: 2025-08-12 | End: 2025-08-13

## 2025-08-12 RX ORDER — SODIUM CHLORIDE 9 MG/ML
INJECTION, SOLUTION INTRAVENOUS CONTINUOUS PRN
Status: DISCONTINUED | OUTPATIENT
Start: 2025-08-12 | End: 2025-08-12

## 2025-08-12 RX ORDER — BUPIVACAINE HYDROCHLORIDE 5 MG/ML
24 INJECTION, SOLUTION EPIDURAL; INTRACAUDAL; PERINEURAL ONCE
Status: DISCONTINUED | OUTPATIENT
Start: 2025-08-12 | End: 2025-08-12

## 2025-08-12 RX ORDER — METOCLOPRAMIDE HYDROCHLORIDE 5 MG/ML
10 INJECTION INTRAMUSCULAR; INTRAVENOUS EVERY 6 HOURS PRN
Status: DISCONTINUED | OUTPATIENT
Start: 2025-08-12 | End: 2025-08-14 | Stop reason: HOSPADM

## 2025-08-12 RX ORDER — CARBOPROST TROMETHAMINE 250 UG/ML
250 INJECTION, SOLUTION INTRAMUSCULAR
Status: DISCONTINUED | OUTPATIENT
Start: 2025-08-12 | End: 2025-08-12

## 2025-08-12 RX ORDER — OXYCODONE AND ACETAMINOPHEN 5; 325 MG/1; MG/1
1 TABLET ORAL EVERY 4 HOURS PRN
Refills: 0 | Status: DISCONTINUED | OUTPATIENT
Start: 2025-08-12 | End: 2025-08-14 | Stop reason: HOSPADM

## 2025-08-12 RX ORDER — OXYTOCIN-SODIUM CHLORIDE 0.9% IV SOLN 30 UNIT/500ML 30-0.9/5 UT/ML-%
30 SOLUTION INTRAVENOUS ONCE AS NEEDED
Status: DISCONTINUED | OUTPATIENT
Start: 2025-08-12 | End: 2025-08-12

## 2025-08-12 RX ORDER — MORPHINE SULFATE 0.5 MG/ML
INJECTION, SOLUTION EPIDURAL; INTRATHECAL; INTRAVENOUS
Status: DISCONTINUED | OUTPATIENT
Start: 2025-08-12 | End: 2025-08-12

## 2025-08-12 RX ORDER — OXYTOCIN/0.9 % SODIUM CHLORIDE 15/250 ML
95 PLASTIC BAG, INJECTION (ML) INTRAVENOUS CONTINUOUS PRN
Status: DISCONTINUED | OUTPATIENT
Start: 2025-08-12 | End: 2025-08-12

## 2025-08-12 RX ORDER — MISOPROSTOL 200 UG/1
800 TABLET ORAL ONCE AS NEEDED
Status: DISCONTINUED | OUTPATIENT
Start: 2025-08-12 | End: 2025-08-12

## 2025-08-12 RX ORDER — LIDOCAINE HYDROCHLORIDE 10 MG/ML
10 INJECTION, SOLUTION INFILTRATION; PERINEURAL ONCE AS NEEDED
Status: DISCONTINUED | OUTPATIENT
Start: 2025-08-12 | End: 2025-08-12

## 2025-08-12 RX ORDER — SIMETHICONE 80 MG
1 TABLET,CHEWABLE ORAL EVERY 6 HOURS PRN
Status: DISCONTINUED | OUTPATIENT
Start: 2025-08-12 | End: 2025-08-14 | Stop reason: HOSPADM

## 2025-08-12 RX ORDER — CEFAZOLIN 2 G/1
2 INJECTION, POWDER, FOR SOLUTION INTRAMUSCULAR; INTRAVENOUS ONCE
Status: DISCONTINUED | OUTPATIENT
Start: 2025-08-12 | End: 2025-08-12

## 2025-08-12 RX ORDER — MISOPROSTOL 200 UG/1
800 TABLET ORAL ONCE AS NEEDED
Status: DISCONTINUED | OUTPATIENT
Start: 2025-08-12 | End: 2025-08-14 | Stop reason: HOSPADM

## 2025-08-12 RX ORDER — SODIUM CHLORIDE 0.9 % (FLUSH) 0.9 %
10 SYRINGE (ML) INJECTION
Status: DISCONTINUED | OUTPATIENT
Start: 2025-08-12 | End: 2025-08-14 | Stop reason: HOSPADM

## 2025-08-12 RX ORDER — BUPIVACAINE 13.3 MG/ML
20 INJECTION, SUSPENSION, LIPOSOMAL INFILTRATION ONCE
Status: DISCONTINUED | OUTPATIENT
Start: 2025-08-12 | End: 2025-08-12

## 2025-08-12 RX ORDER — PHENYLEPHRINE HYDROCHLORIDE 10 MG/ML
INJECTION INTRAVENOUS
Status: DISCONTINUED | OUTPATIENT
Start: 2025-08-12 | End: 2025-08-12

## 2025-08-12 RX ORDER — CEFAZOLIN 2 G/1
INJECTION, POWDER, FOR SOLUTION INTRAMUSCULAR; INTRAVENOUS
Status: DISCONTINUED | OUTPATIENT
Start: 2025-08-12 | End: 2025-08-12

## 2025-08-12 RX ORDER — OXYTOCIN/0.9 % SODIUM CHLORIDE 15/250 ML
95 PLASTIC BAG, INJECTION (ML) INTRAVENOUS ONCE AS NEEDED
Status: DISCONTINUED | OUTPATIENT
Start: 2025-08-12 | End: 2025-08-14 | Stop reason: HOSPADM

## 2025-08-12 RX ORDER — ONDANSETRON HYDROCHLORIDE 2 MG/ML
INJECTION, SOLUTION INTRAVENOUS
Status: DISCONTINUED | OUTPATIENT
Start: 2025-08-12 | End: 2025-08-12

## 2025-08-12 RX ORDER — OXYCODONE AND ACETAMINOPHEN 5; 325 MG/1; MG/1
2 TABLET ORAL EVERY 4 HOURS PRN
Status: DISCONTINUED | OUTPATIENT
Start: 2025-08-12 | End: 2025-08-14 | Stop reason: HOSPADM

## 2025-08-12 RX ORDER — DIPHENOXYLATE HYDROCHLORIDE AND ATROPINE SULFATE 2.5; .025 MG/1; MG/1
2 TABLET ORAL EVERY 6 HOURS PRN
Status: DISCONTINUED | OUTPATIENT
Start: 2025-08-12 | End: 2025-08-12

## 2025-08-12 RX ORDER — DIPHENOXYLATE HYDROCHLORIDE AND ATROPINE SULFATE 2.5; .025 MG/1; MG/1
2 TABLET ORAL EVERY 6 HOURS PRN
Status: DISCONTINUED | OUTPATIENT
Start: 2025-08-12 | End: 2025-08-14 | Stop reason: HOSPADM

## 2025-08-12 RX ORDER — OXYTOCIN 10 [USP'U]/ML
10 INJECTION, SOLUTION INTRAMUSCULAR; INTRAVENOUS ONCE AS NEEDED
Status: DISCONTINUED | OUTPATIENT
Start: 2025-08-12 | End: 2025-08-14 | Stop reason: HOSPADM

## 2025-08-12 RX ORDER — AMOXICILLIN 250 MG
1 CAPSULE ORAL NIGHTLY PRN
Status: DISCONTINUED | OUTPATIENT
Start: 2025-08-12 | End: 2025-08-14 | Stop reason: HOSPADM

## 2025-08-12 RX ORDER — DIPHENHYDRAMINE HCL 25 MG
25 CAPSULE ORAL EVERY 4 HOURS PRN
Status: DISCONTINUED | OUTPATIENT
Start: 2025-08-12 | End: 2025-08-14 | Stop reason: HOSPADM

## 2025-08-12 RX ORDER — DEXAMETHASONE SODIUM PHOSPHATE 4 MG/ML
INJECTION, SOLUTION INTRA-ARTICULAR; INTRALESIONAL; INTRAMUSCULAR; INTRAVENOUS; SOFT TISSUE
Status: DISCONTINUED | OUTPATIENT
Start: 2025-08-12 | End: 2025-08-12

## 2025-08-12 RX ORDER — PROCHLORPERAZINE EDISYLATE 5 MG/ML
5 INJECTION INTRAMUSCULAR; INTRAVENOUS EVERY 6 HOURS PRN
Status: DISCONTINUED | OUTPATIENT
Start: 2025-08-12 | End: 2025-08-14 | Stop reason: HOSPADM

## 2025-08-12 RX ORDER — OXYTOCIN/0.9 % SODIUM CHLORIDE 15/250 ML
95 PLASTIC BAG, INJECTION (ML) INTRAVENOUS ONCE AS NEEDED
Status: DISCONTINUED | OUTPATIENT
Start: 2025-08-12 | End: 2025-08-12

## 2025-08-12 RX ORDER — LIDOCAINE HCL/EPINEPHRINE/PF 2%-1:200K
1 VIAL (ML) INJECTION ONCE
Status: COMPLETED | OUTPATIENT
Start: 2025-08-12 | End: 2025-08-12

## 2025-08-12 RX ORDER — BUPIVACAINE HYDROCHLORIDE 5 MG/ML
10 INJECTION, SOLUTION EPIDURAL; INTRACAUDAL; PERINEURAL ONCE AS NEEDED
Status: COMPLETED | OUTPATIENT
Start: 2025-08-12 | End: 2025-08-12

## 2025-08-12 RX ORDER — ONDANSETRON HYDROCHLORIDE 2 MG/ML
4 INJECTION, SOLUTION INTRAVENOUS EVERY 12 HOURS PRN
Status: DISCONTINUED | OUTPATIENT
Start: 2025-08-12 | End: 2025-08-12

## 2025-08-12 RX ORDER — TRANEXAMIC ACID 10 MG/ML
1000 INJECTION, SOLUTION INTRAVENOUS EVERY 30 MIN PRN
Status: DISCONTINUED | OUTPATIENT
Start: 2025-08-12 | End: 2025-08-12

## 2025-08-12 RX ORDER — ACETAMINOPHEN 10 MG/ML
INJECTION, SOLUTION INTRAVENOUS
Status: DISCONTINUED | OUTPATIENT
Start: 2025-08-12 | End: 2025-08-12

## 2025-08-12 RX ORDER — METHYLERGONOVINE MALEATE 0.2 MG/ML
200 INJECTION INTRAVENOUS ONCE AS NEEDED
Status: DISCONTINUED | OUTPATIENT
Start: 2025-08-12 | End: 2025-08-14 | Stop reason: HOSPADM

## 2025-08-12 RX ORDER — TRANEXAMIC ACID 10 MG/ML
1000 INJECTION, SOLUTION INTRAVENOUS EVERY 30 MIN PRN
Status: DISCONTINUED | OUTPATIENT
Start: 2025-08-12 | End: 2025-08-14 | Stop reason: HOSPADM

## 2025-08-12 RX ORDER — ONDANSETRON HYDROCHLORIDE 2 MG/ML
INJECTION, SOLUTION INTRAMUSCULAR; INTRAVENOUS
Status: DISCONTINUED | OUTPATIENT
Start: 2025-08-12 | End: 2025-08-12

## 2025-08-12 RX ORDER — OXYTOCIN/0.9 % SODIUM CHLORIDE 15/250 ML
95 PLASTIC BAG, INJECTION (ML) INTRAVENOUS CONTINUOUS PRN
Status: DISCONTINUED | OUTPATIENT
Start: 2025-08-12 | End: 2025-08-14 | Stop reason: HOSPADM

## 2025-08-12 RX ORDER — ONDANSETRON HYDROCHLORIDE 2 MG/ML
4 INJECTION, SOLUTION INTRAVENOUS EVERY 6 HOURS PRN
Status: DISCONTINUED | OUTPATIENT
Start: 2025-08-12 | End: 2025-08-12

## 2025-08-12 RX ORDER — ACETAMINOPHEN 10 MG/ML
1000 INJECTION, SOLUTION INTRAVENOUS
Status: DISCONTINUED | OUTPATIENT
Start: 2025-08-12 | End: 2025-08-12

## 2025-08-12 RX ORDER — OXYTOCIN 10 [USP'U]/ML
10 INJECTION, SOLUTION INTRAMUSCULAR; INTRAVENOUS ONCE AS NEEDED
Status: DISCONTINUED | OUTPATIENT
Start: 2025-08-12 | End: 2025-08-12

## 2025-08-12 RX ORDER — MUPIROCIN 20 MG/G
OINTMENT TOPICAL
Status: DISCONTINUED | OUTPATIENT
Start: 2025-08-12 | End: 2025-08-12

## 2025-08-12 RX ADMIN — ONDANSETRON 4 MG: 2 INJECTION INTRAMUSCULAR; INTRAVENOUS at 09:08

## 2025-08-12 RX ADMIN — KETOROLAC TROMETHAMINE 30 MG: 30 INJECTION, SOLUTION INTRAMUSCULAR; INTRAVENOUS at 06:08

## 2025-08-12 RX ADMIN — Medication 30 UNITS: at 09:08

## 2025-08-12 RX ADMIN — SODIUM CHLORIDE: 0.9 INJECTION, SOLUTION INTRAVENOUS at 09:08

## 2025-08-12 RX ADMIN — PHENYLEPHRINE HYDROCHLORIDE 100 MCG: 10 INJECTION INTRAVENOUS at 09:08

## 2025-08-12 RX ADMIN — SODIUM CHLORIDE, POTASSIUM CHLORIDE, SODIUM LACTATE AND CALCIUM CHLORIDE: 600; 310; 30; 20 INJECTION, SOLUTION INTRAVENOUS at 08:08

## 2025-08-12 RX ADMIN — AZITHROMYCIN MONOHYDRATE 500 MG: 500 INJECTION, POWDER, LYOPHILIZED, FOR SOLUTION INTRAVENOUS at 09:08

## 2025-08-12 RX ADMIN — Medication 30 UNITS: at 10:08

## 2025-08-12 RX ADMIN — DEXTROSE MONOHYDRATE 3 MILLION UNITS: 50 INJECTION, SOLUTION INTRAVENOUS at 08:08

## 2025-08-12 RX ADMIN — KETOROLAC TROMETHAMINE 30 MG: 30 INJECTION, SOLUTION INTRAMUSCULAR; INTRAVENOUS at 11:08

## 2025-08-12 RX ADMIN — DEXAMETHASONE SODIUM PHOSPHATE 4 MG: 4 INJECTION, SOLUTION INTRAMUSCULAR; INTRAVENOUS at 09:08

## 2025-08-12 RX ADMIN — EPHEDRINE SULFATE 10 MG: 50 INJECTION INTRAVENOUS at 09:08

## 2025-08-12 RX ADMIN — LIDOCAINE HYDROCHLORIDE,EPINEPHRINE BITARTRATE 10 ML: 20; .005 INJECTION, SOLUTION EPIDURAL; INFILTRATION; INTRACAUDAL; PERINEURAL at 08:08

## 2025-08-12 RX ADMIN — BUPIVACAINE HYDROCHLORIDE 5 ML: 5 INJECTION, SOLUTION EPIDURAL; INTRACAUDAL; PERINEURAL at 09:08

## 2025-08-12 RX ADMIN — MORPHINE SULFATE 2.5 MG: 0.5 INJECTION, SOLUTION EPIDURAL; INTRATHECAL; INTRAVENOUS at 09:08

## 2025-08-12 RX ADMIN — CEFAZOLIN 2 G: 2 INJECTION, POWDER, FOR SOLUTION INTRAMUSCULAR; INTRAVENOUS at 09:08

## 2025-08-12 RX ADMIN — DEXTROSE MONOHYDRATE 3 MILLION UNITS: 50 INJECTION, SOLUTION INTRAVENOUS at 04:08

## 2025-08-12 RX ADMIN — DEXTROSE MONOHYDRATE 3 MILLION UNITS: 50 INJECTION, SOLUTION INTRAVENOUS at 12:08

## 2025-08-12 RX ADMIN — ACETAMINOPHEN 1000 MG: 10 INJECTION, SOLUTION INTRAVENOUS at 09:08

## 2025-08-13 LAB
ABSOLUTE EOSINOPHIL (SMH): 0.32 K/UL
ABSOLUTE MONOCYTE (SMH): 1.71 K/UL (ref 0.3–1)
ABSOLUTE NEUTROPHIL COUNT (SMH): 12.3 K/UL (ref 1.8–7.7)
BASOPHILS # BLD AUTO: 0.04 K/UL
BASOPHILS NFR BLD AUTO: 0.3 %
ERYTHROCYTE [DISTWIDTH] IN BLOOD BY AUTOMATED COUNT: 12.7 % (ref 11.5–14.5)
HCT VFR BLD AUTO: 27.3 % (ref 37–48.5)
HGB BLD-MCNC: 8.9 GM/DL (ref 12–16)
IMM GRANULOCYTES # BLD AUTO: 0.19 K/UL (ref 0–0.04)
IMM GRANULOCYTES NFR BLD AUTO: 1.2 % (ref 0–0.5)
LYMPHOCYTES # BLD AUTO: 1.45 K/UL (ref 1–4.8)
MCH RBC QN AUTO: 29.5 PG (ref 27–31)
MCHC RBC AUTO-ENTMCNC: 32.6 G/DL (ref 32–36)
MCV RBC AUTO: 90 FL (ref 82–98)
NUCLEATED RBC (/100WBC) (SMH): 0 /100 WBC
PLATELET # BLD AUTO: 245 K/UL (ref 150–450)
PMV BLD AUTO: 10.5 FL (ref 9.2–12.9)
RBC # BLD AUTO: 3.02 M/UL (ref 4–5.4)
RELATIVE EOSINOPHIL (SMH): 2 % (ref 0–8)
RELATIVE LYMPHOCYTE (SMH): 9.1 % (ref 18–48)
RELATIVE MONOCYTE (SMH): 10.7 % (ref 4–15)
RELATIVE NEUTROPHIL (SMH): 76.7 % (ref 38–73)
WBC # BLD AUTO: 16 K/UL (ref 3.9–12.7)

## 2025-08-13 PROCEDURE — 11000001 HC ACUTE MED/SURG PRIVATE ROOM

## 2025-08-13 PROCEDURE — 99900035 HC TECH TIME PER 15 MIN (STAT)

## 2025-08-13 PROCEDURE — 94799 UNLISTED PULMONARY SVC/PX: CPT

## 2025-08-13 PROCEDURE — 85025 COMPLETE CBC W/AUTO DIFF WBC: CPT | Performed by: GENERAL PRACTICE

## 2025-08-13 PROCEDURE — 99900031 HC PATIENT EDUCATION (STAT)

## 2025-08-13 PROCEDURE — 36415 COLL VENOUS BLD VENIPUNCTURE: CPT | Performed by: GENERAL PRACTICE

## 2025-08-13 PROCEDURE — 94761 N-INVAS EAR/PLS OXIMETRY MLT: CPT

## 2025-08-13 PROCEDURE — 99024 POSTOP FOLLOW-UP VISIT: CPT | Mod: ,,, | Performed by: GENERAL PRACTICE

## 2025-08-13 PROCEDURE — 25000003 PHARM REV CODE 250: Performed by: GENERAL PRACTICE

## 2025-08-13 PROCEDURE — 63600175 PHARM REV CODE 636 W HCPCS: Mod: JZ,TB | Performed by: GENERAL PRACTICE

## 2025-08-13 RX ORDER — LANOLIN ALCOHOL/MO/W.PET/CERES
1 CREAM (GRAM) TOPICAL DAILY
Status: DISCONTINUED | OUTPATIENT
Start: 2025-08-13 | End: 2025-08-14 | Stop reason: HOSPADM

## 2025-08-13 RX ADMIN — SIMETHICONE 80 MG: 80 TABLET, CHEWABLE ORAL at 09:08

## 2025-08-13 RX ADMIN — FERROUS SULFATE TAB EC 325 MG (65 MG FE EQUIVALENT) 1 EACH: 325 (65 FE) TABLET DELAYED RESPONSE at 12:08

## 2025-08-13 RX ADMIN — OXYCODONE HYDROCHLORIDE AND ACETAMINOPHEN 1 TABLET: 5; 325 TABLET ORAL at 06:08

## 2025-08-13 RX ADMIN — KETOROLAC TROMETHAMINE 30 MG: 30 INJECTION, SOLUTION INTRAMUSCULAR; INTRAVENOUS at 05:08

## 2025-08-13 RX ADMIN — KETOROLAC TROMETHAMINE 30 MG: 30 INJECTION, SOLUTION INTRAMUSCULAR; INTRAVENOUS at 12:08

## 2025-08-13 RX ADMIN — IBUPROFEN 800 MG: 400 TABLET ORAL at 09:08

## 2025-08-13 RX ADMIN — SENNOSIDES AND DOCUSATE SODIUM 1 TABLET: 50; 8.6 TABLET ORAL at 12:08

## 2025-08-13 RX ADMIN — OXYCODONE HYDROCHLORIDE AND ACETAMINOPHEN 1 TABLET: 5; 325 TABLET ORAL at 09:08

## 2025-08-14 VITALS
DIASTOLIC BLOOD PRESSURE: 65 MMHG | HEART RATE: 89 BPM | SYSTOLIC BLOOD PRESSURE: 101 MMHG | OXYGEN SATURATION: 99 % | HEIGHT: 63 IN | BODY MASS INDEX: 32.93 KG/M2 | RESPIRATION RATE: 18 BRPM | TEMPERATURE: 98 F | WEIGHT: 185.88 LBS

## 2025-08-14 PROCEDURE — 25000003 PHARM REV CODE 250: Performed by: GENERAL PRACTICE

## 2025-08-14 RX ORDER — FERROUS SULFATE 325(65) MG
325 TABLET ORAL
Qty: 48 TABLET | Refills: 3 | Status: SHIPPED | OUTPATIENT
Start: 2025-08-14 | End: 2026-08-14

## 2025-08-14 RX ORDER — OXYCODONE AND ACETAMINOPHEN 5; 325 MG/1; MG/1
1 TABLET ORAL EVERY 4 HOURS PRN
Qty: 18 TABLET | Refills: 0 | Status: SHIPPED | OUTPATIENT
Start: 2025-08-14

## 2025-08-14 RX ORDER — IBUPROFEN 600 MG/1
600 TABLET, FILM COATED ORAL EVERY 6 HOURS PRN
Qty: 40 TABLET | Refills: 1 | Status: SHIPPED | OUTPATIENT
Start: 2025-08-14

## 2025-08-14 RX ADMIN — PRENATAL VIT W/ FE FUMARATE-FA TAB 27-0.8 MG 1 TABLET: 27-0.8 TAB at 08:08

## 2025-08-14 RX ADMIN — SENNOSIDES AND DOCUSATE SODIUM 1 TABLET: 50; 8.6 TABLET ORAL at 08:08

## 2025-08-14 RX ADMIN — IBUPROFEN 800 MG: 400 TABLET ORAL at 08:08

## 2025-08-14 RX ADMIN — OXYCODONE HYDROCHLORIDE AND ACETAMINOPHEN 1 TABLET: 5; 325 TABLET ORAL at 04:08

## 2025-08-14 RX ADMIN — FERROUS SULFATE TAB EC 325 MG (65 MG FE EQUIVALENT) 1 EACH: 325 (65 FE) TABLET DELAYED RESPONSE at 08:08

## 2025-08-20 ENCOUNTER — PATIENT MESSAGE (OUTPATIENT)
Dept: OBSTETRICS AND GYNECOLOGY | Facility: CLINIC | Age: 25
End: 2025-08-20
Payer: COMMERCIAL

## 2025-08-21 ENCOUNTER — HOSPITAL ENCOUNTER (EMERGENCY)
Facility: HOSPITAL | Age: 25
Discharge: HOME | End: 2025-08-22
Attending: STUDENT IN AN ORGANIZED HEALTH CARE EDUCATION/TRAINING PROGRAM
Payer: OTHER GOVERNMENT

## 2025-08-21 ENCOUNTER — NURSE TRIAGE (OUTPATIENT)
Dept: ADMINISTRATIVE | Facility: CLINIC | Age: 25
End: 2025-08-21
Payer: COMMERCIAL

## 2025-08-21 DIAGNOSIS — Z13.6 SCREENING FOR CARDIOVASCULAR CONDITION: ICD-10-CM

## 2025-08-21 DIAGNOSIS — N61.0 MASTITIS: Primary | ICD-10-CM

## 2025-08-21 DIAGNOSIS — N64.4 BREAST PAIN, LEFT: ICD-10-CM

## 2025-08-21 LAB
ABSOLUTE EOSINOPHIL (SMH): 0.06 K/UL
ABSOLUTE MONOCYTE (SMH): 1.01 K/UL (ref 0.3–1)
ABSOLUTE NEUTROPHIL COUNT (SMH): 15.2 K/UL (ref 1.8–7.7)
ALBUMIN SERPL-MCNC: 3.9 G/DL (ref 3.5–5.2)
ALP SERPL-CCNC: 122 UNIT/L (ref 55–135)
ALT SERPL-CCNC: 11 UNIT/L (ref 10–44)
ANION GAP (SMH): 15 MMOL/L (ref 8–16)
AST SERPL-CCNC: 12 UNIT/L (ref 10–40)
BACTERIA #/AREA URNS AUTO: ABNORMAL /HPF
BASOPHILS # BLD AUTO: 0.03 K/UL
BASOPHILS NFR BLD AUTO: 0.2 %
BILIRUB SERPL-MCNC: 0.7 MG/DL (ref 0.1–1)
BILIRUB UR QL STRIP.AUTO: NEGATIVE
BUN SERPL-MCNC: 10 MG/DL (ref 6–20)
CALCIUM SERPL-MCNC: 8.9 MG/DL (ref 8.7–10.5)
CHLORIDE SERPL-SCNC: 106 MMOL/L (ref 95–110)
CLARITY UR: CLEAR
CO2 SERPL-SCNC: 18 MMOL/L (ref 23–29)
COLOR UR AUTO: YELLOW
CREAT SERPL-MCNC: 0.7 MG/DL (ref 0.5–1.4)
ERYTHROCYTE [DISTWIDTH] IN BLOOD BY AUTOMATED COUNT: 12.5 % (ref 11.5–14.5)
GFR SERPLBLD CREATININE-BSD FMLA CKD-EPI: >60 ML/MIN/1.73/M2
GLUCOSE SERPL-MCNC: 94 MG/DL (ref 70–110)
GLUCOSE UR QL STRIP: NEGATIVE
HCT VFR BLD AUTO: 31 % (ref 37–48.5)
HGB BLD-MCNC: 10.4 GM/DL (ref 12–16)
HGB UR QL STRIP: ABNORMAL
IMM GRANULOCYTES # BLD AUTO: 0.1 K/UL (ref 0–0.04)
IMM GRANULOCYTES NFR BLD AUTO: 0.6 % (ref 0–0.5)
KETONES UR QL STRIP: ABNORMAL
LDH SERPL L TO P-CCNC: 0.53 MMOL/L (ref 0.5–2.2)
LEUKOCYTE ESTERASE UR QL STRIP: ABNORMAL
LYMPHOCYTES # BLD AUTO: 0.78 K/UL (ref 1–4.8)
MCH RBC QN AUTO: 29 PG (ref 27–31)
MCHC RBC AUTO-ENTMCNC: 33.5 G/DL (ref 32–36)
MCV RBC AUTO: 86 FL (ref 82–98)
MICROSCOPIC COMMENT: ABNORMAL
NITRITE UR QL STRIP: NEGATIVE
NUCLEATED RBC (/100WBC) (SMH): 0 /100 WBC
PH UR STRIP: 6 [PH]
PLATELET # BLD AUTO: 485 K/UL (ref 150–450)
PMV BLD AUTO: 8.8 FL (ref 9.2–12.9)
POTASSIUM SERPL-SCNC: 3 MMOL/L (ref 3.5–5.1)
PROT SERPL-MCNC: 7.1 GM/DL (ref 6–8.4)
PROT UR QL STRIP: NEGATIVE
RBC # BLD AUTO: 3.59 M/UL (ref 4–5.4)
RBC #/AREA URNS AUTO: 25 /HPF
RELATIVE EOSINOPHIL (SMH): 0.4 % (ref 0–8)
RELATIVE LYMPHOCYTE (SMH): 4.6 % (ref 18–48)
RELATIVE MONOCYTE (SMH): 5.9 % (ref 4–15)
RELATIVE NEUTROPHIL (SMH): 88.3 % (ref 38–73)
SAMPLE: NORMAL
SODIUM SERPL-SCNC: 139 MMOL/L (ref 136–145)
SP GR UR STRIP: 1.01
SQUAMOUS #/AREA URNS AUTO: 4 /HPF
UROBILINOGEN UR STRIP-ACNC: NEGATIVE EU/DL
WBC # BLD AUTO: 17.13 K/UL (ref 3.9–12.7)
WBC #/AREA URNS AUTO: 12 /HPF

## 2025-08-21 PROCEDURE — 81001 URINALYSIS AUTO W/SCOPE: CPT | Performed by: NURSE PRACTITIONER

## 2025-08-21 PROCEDURE — 96361 HYDRATE IV INFUSION ADD-ON: CPT

## 2025-08-21 PROCEDURE — 87040 BLOOD CULTURE FOR BACTERIA: CPT | Performed by: NURSE PRACTITIONER

## 2025-08-21 PROCEDURE — 82040 ASSAY OF SERUM ALBUMIN: CPT | Performed by: NURSE PRACTITIONER

## 2025-08-21 PROCEDURE — 25000003 PHARM REV CODE 250: Performed by: STUDENT IN AN ORGANIZED HEALTH CARE EDUCATION/TRAINING PROGRAM

## 2025-08-21 PROCEDURE — 36415 COLL VENOUS BLD VENIPUNCTURE: CPT | Performed by: NURSE PRACTITIONER

## 2025-08-21 PROCEDURE — 25000003 PHARM REV CODE 250

## 2025-08-21 PROCEDURE — 96375 TX/PRO/DX INJ NEW DRUG ADDON: CPT

## 2025-08-21 PROCEDURE — 63600175 PHARM REV CODE 636 W HCPCS

## 2025-08-21 PROCEDURE — 99285 EMERGENCY DEPT VISIT HI MDM: CPT | Mod: 25

## 2025-08-21 PROCEDURE — 87086 URINE CULTURE/COLONY COUNT: CPT | Performed by: NURSE PRACTITIONER

## 2025-08-21 PROCEDURE — 25000003 PHARM REV CODE 250: Performed by: NURSE PRACTITIONER

## 2025-08-21 PROCEDURE — 96365 THER/PROPH/DIAG IV INF INIT: CPT

## 2025-08-21 PROCEDURE — 85025 COMPLETE CBC W/AUTO DIFF WBC: CPT | Performed by: NURSE PRACTITIONER

## 2025-08-21 PROCEDURE — 63600175 PHARM REV CODE 636 W HCPCS: Performed by: STUDENT IN AN ORGANIZED HEALTH CARE EDUCATION/TRAINING PROGRAM

## 2025-08-21 RX ORDER — ACETAMINOPHEN 325 MG/1
650 TABLET ORAL
Status: COMPLETED | OUTPATIENT
Start: 2025-08-21 | End: 2025-08-21

## 2025-08-21 RX ORDER — CEFTRIAXONE 1 G/1
1 INJECTION, POWDER, FOR SOLUTION INTRAMUSCULAR; INTRAVENOUS ONCE
Status: COMPLETED | OUTPATIENT
Start: 2025-08-21 | End: 2025-08-21

## 2025-08-21 RX ORDER — DIPHENHYDRAMINE HCL 25 MG
25 CAPSULE ORAL
Status: COMPLETED | OUTPATIENT
Start: 2025-08-21 | End: 2025-08-21

## 2025-08-21 RX ADMIN — ACETAMINOPHEN 650 MG: 325 TABLET ORAL at 07:08

## 2025-08-21 RX ADMIN — DIPHENHYDRAMINE HYDROCHLORIDE 25 MG: 25 CAPSULE ORAL at 10:08

## 2025-08-21 RX ADMIN — SODIUM CHLORIDE 1000 ML: 9 INJECTION, SOLUTION INTRAVENOUS at 08:08

## 2025-08-21 RX ADMIN — CEFTRIAXONE SODIUM 1 G: 1 INJECTION, POWDER, FOR SOLUTION INTRAMUSCULAR; INTRAVENOUS at 09:08

## 2025-08-21 RX ADMIN — VANCOMYCIN HYDROCHLORIDE 1500 MG: 1.5 INJECTION, POWDER, LYOPHILIZED, FOR SOLUTION INTRAVENOUS at 09:08

## 2025-08-22 ENCOUNTER — POSTPARTUM VISIT (OUTPATIENT)
Dept: OBSTETRICS AND GYNECOLOGY | Facility: CLINIC | Age: 25
End: 2025-08-22
Payer: COMMERCIAL

## 2025-08-22 VITALS
BODY MASS INDEX: 29.23 KG/M2 | RESPIRATION RATE: 16 BRPM | DIASTOLIC BLOOD PRESSURE: 70 MMHG | WEIGHT: 165 LBS | OXYGEN SATURATION: 99 % | TEMPERATURE: 98 F | SYSTOLIC BLOOD PRESSURE: 147 MMHG | HEIGHT: 63 IN | HEART RATE: 81 BPM

## 2025-08-22 VITALS — WEIGHT: 163 LBS | DIASTOLIC BLOOD PRESSURE: 78 MMHG | BODY MASS INDEX: 28.88 KG/M2 | SYSTOLIC BLOOD PRESSURE: 126 MMHG

## 2025-08-22 DIAGNOSIS — O91.23 MASTITIS ASSOCIATED WITH LACTATION: ICD-10-CM

## 2025-08-22 PROCEDURE — 99999 PR PBB SHADOW E&M-EST. PATIENT-LVL III: CPT | Mod: PBBFAC,,, | Performed by: GENERAL PRACTICE

## 2025-08-22 RX ORDER — DICLOXACILLIN SODIUM 500 MG/1
500 CAPSULE ORAL EVERY 6 HOURS
Qty: 40 CAPSULE | Refills: 0 | Status: SHIPPED | OUTPATIENT
Start: 2025-08-22 | End: 2025-08-22

## 2025-08-22 RX ORDER — DICLOXACILLIN SODIUM 500 MG/1
500 CAPSULE ORAL EVERY 6 HOURS
Qty: 40 CAPSULE | Refills: 0 | Status: SHIPPED | OUTPATIENT
Start: 2025-08-22 | End: 2025-09-01

## 2025-08-24 LAB — BACTERIA UR CULT: NO GROWTH

## 2025-08-26 LAB
BACTERIA BLD CULT: NORMAL
BACTERIA BLD CULT: NORMAL

## (undated) DEVICE — Device

## (undated) DEVICE — ELECTRODE REM PLYHSV RETURN 9

## (undated) DEVICE — DRESSING TELFA N ADH 4X10